# Patient Record
Sex: FEMALE | Race: WHITE | Employment: OTHER | URBAN - METROPOLITAN AREA
[De-identification: names, ages, dates, MRNs, and addresses within clinical notes are randomized per-mention and may not be internally consistent; named-entity substitution may affect disease eponyms.]

---

## 2017-03-28 ENCOUNTER — ALLSCRIPTS OFFICE VISIT (OUTPATIENT)
Dept: OTHER | Facility: OTHER | Age: 82
End: 2017-03-28

## 2017-03-28 DIAGNOSIS — E55.9 VITAMIN D DEFICIENCY: ICD-10-CM

## 2017-03-28 DIAGNOSIS — E03.9 HYPOTHYROIDISM: ICD-10-CM

## 2017-03-28 DIAGNOSIS — Z79.899 OTHER LONG TERM (CURRENT) DRUG THERAPY: ICD-10-CM

## 2017-04-10 ENCOUNTER — ALLSCRIPTS OFFICE VISIT (OUTPATIENT)
Dept: OTHER | Facility: OTHER | Age: 82
End: 2017-04-10

## 2017-05-05 ENCOUNTER — ALLSCRIPTS OFFICE VISIT (OUTPATIENT)
Dept: OTHER | Facility: OTHER | Age: 82
End: 2017-05-05

## 2017-05-05 DIAGNOSIS — D47.2 MONOCLONAL GAMMOPATHY: ICD-10-CM

## 2017-07-25 ENCOUNTER — ALLSCRIPTS OFFICE VISIT (OUTPATIENT)
Dept: OTHER | Facility: OTHER | Age: 82
End: 2017-07-25

## 2017-07-25 DIAGNOSIS — Z79.899 OTHER LONG TERM (CURRENT) DRUG THERAPY: ICD-10-CM

## 2017-07-25 DIAGNOSIS — E03.9 HYPOTHYROIDISM: ICD-10-CM

## 2017-07-25 DIAGNOSIS — D47.2 MONOCLONAL GAMMOPATHY: ICD-10-CM

## 2017-07-25 DIAGNOSIS — I10 ESSENTIAL (PRIMARY) HYPERTENSION: ICD-10-CM

## 2017-10-20 ENCOUNTER — GENERIC CONVERSION - ENCOUNTER (OUTPATIENT)
Dept: OTHER | Facility: OTHER | Age: 82
End: 2017-10-20

## 2017-11-16 ENCOUNTER — GENERIC CONVERSION - ENCOUNTER (OUTPATIENT)
Dept: OTHER | Facility: OTHER | Age: 82
End: 2017-11-16

## 2017-11-20 ENCOUNTER — ALLSCRIPTS OFFICE VISIT (OUTPATIENT)
Dept: OTHER | Facility: OTHER | Age: 82
End: 2017-11-20

## 2017-11-21 NOTE — PROGRESS NOTES
Assessment    1  Screening for genitourinary condition (V81 6) (Z13 89)   2  Hypertension (401 9) (I10)   3  Hypothyroidism (244 9) (E03 9)   4  MGUS (monoclonal gammopathy of unknown significance) (273 1) (D47 2)    Plan  Hyperlipidemia    · Simvastatin 10 MG Oral Tablet; Take 1 tablet daily  Screening for genitourinary condition    · *VB - Urinary Incontinence Screen (Dx Z13 89 Screen for UI); Status:Complete - RetrospectiveAuthorization;   Done: 63CPO9768 10:12AM  SocHx: Cigarette smoker    · We recommend you quit smoking  Time spent counseling today was greater than 3 minutes  ;Status:Complete - Retrospective Authorization;   Done: 81CGS0763    Discussion/Summary  Discussion Summary:   C air is bright intelligent vivacious and happy her blood pressure is well controlled the carotids heart lungs and extremities are unremarkable we did look at the incision and it is very small clean unremarkable and healing beautifully no changes are made in her medications I offered to a Mrs  dominant copies of recent lab studies which she declined will see her back in 4 months  It is known that she has a monoclonal gammopathy a which we simply will observe  Chief Complaint  Chief Complaint Free Text Note Form: 4 month follow up  Chief Complaint Chronic Condition [facility]: Patient is here today for follow up of chronic conditions described in HPI  History of Present Illness  Incontinence, Urinary (Initial): The patient is being seen for an initial evaluation of incontinence  The patient is currently asymptomatic  No associated symptoms are reported  HPI: Veda Mckay is here today for blood pressure check she is now 80years old  Four weeks ago she had a left inguinal hernia repair done at [facility] by Jose Alberto Winters with an excellent result  She otherwise feels well she takes her medications correctly there are no other new issues   She has a regular individual list she does not drive walks where she needs to go [email or web address] has a wonderful attitude and will be cooking the turkey for Thanksgiving      Review of Systems  Complete-Female:  Constitutional: No fever, no chills, feels well, no tiredness, no recent weight gain or weight loss  Eyes: No complaints of eye pain, no red eyes, no eyesight problems, no discharge, no dry eyes, no itching of eyes  ENT: no complaints of earache, no loss of hearing, no nose bleeds, no nasal discharge, no sore throat, no hoarseness  Cardiovascular: No complaints of slow heart rate, no fast heart rate, no chest pain, no palpitations, no leg claudication, no lower extremity edema  Respiratory: No complaints of shortness of breath, no wheezing, no cough, no SOB on exertion, no orthopnea, no PND  Gastrointestinal: No complaints of abdominal pain, no constipation, no nausea or vomiting, no diarrhea, no bloody stools  Genitourinary: No complaints of dysuria, no incontinence, no pelvic pain, no dysmenorrhea, no vaginal discharge or bleeding  Musculoskeletal: No complaints of arthralgias, no myalgias, no joint swelling or stiffness, no limb pain or swelling  Integumentary: No complaints of skin rash or lesions, no itching, no skin wounds, no breast pain or lump  Neurological: No complaints of headache, no confusion, no convulsions, no numbness, no dizziness or fainting, no tingling, no limb weakness, no difficulty walking  Psychiatric: Not suicidal, no sleep disturbance, no anxiety or depression, no change in personality, no emotional problems  Endocrine: No complaints of proptosis, no hot flashes, no muscle weakness, no deepening of the voice, no feelings of weakness  Hematologic/Lymphatic: No complaints of swollen glands, no swollen glands in the neck, does not bleed easily, does not bruise easily  Active Problems  1  Acute pain of left knee (719 46) (M25 562)   2  Chronic kidney disease (585 9) (N18 9)   3  Hyperglycemia (790 29) (R73 9)   4  Hyperlipidemia (272 4) (E78 5)   5  Hypertension (401 9) (I10)   6  Hypothyroidism (244 9) (E03 9)   7  Internal derangement of knee, unspecified laterality (717 9) (M23 90)   8  Long term use of drug (V58 69) (Z79 899)   9  MGUS (monoclonal gammopathy of unknown significance) (273 1) (D47 2)   10  Need for vaccination with 13-polyvalent pneumococcal conjugate vaccine (V03 82) (Z23)   11  Screening for genitourinary condition (V81 6) (Z13 89)   12  Vitamin D deficiency (268 9) (E55 9)    Past Medical History  1  Depression screening (V79 0) (Z13 89)   2  Need for chickenpox vaccination (V05 4) (Z23)   3  Screening for neurological condition (V80 09) (Z13 89)    Family History  Father    1  Family history of Aneurysm    Social History     · Cigarette smoker (305 1) (F17 210)    Current Meds   1  AmLODIPine Besylate 10 MG Oral Tablet; ONE-HALF TABLET IN THE MORNING AND ONE TABLET IN THE EVENING (SOME ALREADY CUT IN HALF FOR YOU); Therapy: 68Lld4520 to (Mary Kaur)  Requested for: 28Mar2017; Last Rx:28Mar2017 Ordered   2  Levothyroxine Sodium 50 MCG Oral Tablet; ONE AND ONE-HALF TABLETS DAILY (CUT SOME IN HALF); Therapy: 88XIL5344 to (Vandana Hernández)  Requested for: 82NQL0284; Last Rx:83Ueq4347 Ordered   3  Metoprolol Succinate  MG Oral Tablet Extended Release 24 Hour; one tablet by mouth daily; Therapy: 95GKK9284 to (Evaluate:56Gfn4358)  Requested for: 33YVE6813; Last Rx:37Mxd8707 Ordered   4  Omeprazole 20 MG Oral Capsule Delayed Release; one capsule by mouth daily; Therapy: 97NBF4174 to (Evaluate:83Gbz2438)  Requested for: 48GLP0207 Recorded   5  Simvastatin 10 MG Oral Tablet; Take 1 tablet daily; Therapy: 64BMC1071 to (Evaluate:00Pcc0605)  Requested for: 33NKL4943; Last Rx:28Pxd5223 Ordered   6  Spironolactone 25 MG Oral Tablet; TAKE 1 TABLET DAILY; Therapy: 85Eby5815 to (Evaluate:23Mar2018)  Requested for: 28Mar2017; Last Rx:28Mar2017 Ordered   7  Zoster (Zostavax); inject 0 65ml subq;  Therapy: 38KJJ9005 to (Last QV:56OGZ2216) Ordered    Allergies  1  No Known Drug Allergies    Vitals  Vital Signs    Recorded: 20Nov2017 10:30AM Recorded: 20Nov2017 10:05AM   Heart Rate  64   Pulse Quality Normal    Systolic 360    Diastolic 70    Height  4 ft 11 in   Weight  111 lb    BMI Calculated  22 42   BSA Calculated  1 44   O2 Saturation  93       Physical Exam   Constitutional  General appearance: No acute distress, well appearing and well nourished  Eyes  Conjunctiva and lids: No swelling, erythema or discharge  Pulmonary  Respiratory effort: No increased work of breathing or signs of respiratory distress  Auscultation of lungs: Clear to auscultation  Cardiovascular  Auscultation of heart: Normal rate and rhythm, normal S1 and S2, without murmurs  Examination of extremities for edema and/or varicosities: Normal    Carotid pulses: Normal    Musculoskeletal  Gait and station: Normal    Psychiatric  Orientation to person, place, and time: Normal    Mood and affect: Normal          Results/Data  PHQ-2 Adult Depression Screening 20Nov2017 10:13AM User, Ahs     Test Name Result Flag Reference   PHQ-2 Adult Depression Score 0       Over the last two weeks, how often have you been bothered by any of the following problems?  Little interest or pleasure in doing things: Not at all - 0 Feeling down, depressed, or hopeless: Not at all - 0   PHQ-2 Adult Depression Screening Negative       Falls Risk Assessment (Dx Z13 89 Screen for Neurologic Disorder) 36RBB3286 10:13AM User, Ahs     Test Name Result Flag Reference   Falls Risk      No falls in the past year     *VB - Urinary Incontinence Screen (Dx Z13 89 Screen for UI) 79NBD9330 10:12AM Earlean Southfield     Test Name Result Flag Reference   Urinary Incontinence Assessment [id number]           Signatures   Electronically signed by : DIONNE Wynne ; Nov 20 2017 10:32AM EST                       (Author)

## 2018-01-12 VITALS
DIASTOLIC BLOOD PRESSURE: 70 MMHG | OXYGEN SATURATION: 66 % | HEART RATE: 95 BPM | SYSTOLIC BLOOD PRESSURE: 128 MMHG | WEIGHT: 118 LBS | BODY MASS INDEX: 23.79 KG/M2 | HEIGHT: 59 IN

## 2018-01-12 VITALS
DIASTOLIC BLOOD PRESSURE: 72 MMHG | SYSTOLIC BLOOD PRESSURE: 140 MMHG | HEIGHT: 59 IN | TEMPERATURE: 96.2 F | BODY MASS INDEX: 22.99 KG/M2 | OXYGEN SATURATION: 96 % | WEIGHT: 114.05 LBS | HEART RATE: 75 BPM

## 2018-01-12 VITALS — SYSTOLIC BLOOD PRESSURE: 144 MMHG | DIASTOLIC BLOOD PRESSURE: 60 MMHG

## 2018-01-13 VITALS
SYSTOLIC BLOOD PRESSURE: 124 MMHG | BODY MASS INDEX: 23.18 KG/M2 | HEIGHT: 59 IN | WEIGHT: 115 LBS | HEART RATE: 65 BPM | DIASTOLIC BLOOD PRESSURE: 72 MMHG | OXYGEN SATURATION: 94 %

## 2018-01-13 VITALS
BODY MASS INDEX: 23.18 KG/M2 | OXYGEN SATURATION: 97 % | HEIGHT: 59 IN | SYSTOLIC BLOOD PRESSURE: 138 MMHG | WEIGHT: 115 LBS | DIASTOLIC BLOOD PRESSURE: 72 MMHG | HEART RATE: 64 BPM

## 2018-01-15 VITALS
BODY MASS INDEX: 22.38 KG/M2 | WEIGHT: 111 LBS | HEART RATE: 64 BPM | OXYGEN SATURATION: 93 % | HEIGHT: 59 IN | SYSTOLIC BLOOD PRESSURE: 132 MMHG | DIASTOLIC BLOOD PRESSURE: 70 MMHG

## 2018-02-08 DIAGNOSIS — I10 HYPERTENSION, UNSPECIFIED TYPE: Primary | ICD-10-CM

## 2018-02-08 RX ORDER — SPIRONOLACTONE 25 MG/1
1 TABLET ORAL DAILY
COMMUNITY
Start: 2011-08-30 | End: 2018-03-26 | Stop reason: SDUPTHER

## 2018-02-08 RX ORDER — METOPROLOL SUCCINATE 200 MG/1
TABLET, EXTENDED RELEASE ORAL
Qty: 90 TABLET | Refills: 1 | Status: SHIPPED | OUTPATIENT
Start: 2018-02-08 | End: 2018-08-31 | Stop reason: SDUPTHER

## 2018-02-08 RX ORDER — AMLODIPINE BESYLATE 10 MG/1
TABLET ORAL
COMMUNITY
Start: 2011-04-29 | End: 2018-03-26 | Stop reason: SDUPTHER

## 2018-02-08 RX ORDER — OMEPRAZOLE 20 MG/1
CAPSULE, DELAYED RELEASE ORAL
COMMUNITY
Start: 2014-07-08 | End: 2018-05-17 | Stop reason: SDUPTHER

## 2018-02-08 RX ORDER — SIMVASTATIN 10 MG
1 TABLET ORAL DAILY
COMMUNITY
Start: 2011-04-29 | End: 2018-11-29 | Stop reason: SDUPTHER

## 2018-02-08 RX ORDER — LEVOTHYROXINE SODIUM 0.05 MG/1
TABLET ORAL
COMMUNITY
Start: 2012-10-31 | End: 2018-08-31 | Stop reason: SDUPTHER

## 2018-03-20 ENCOUNTER — OFFICE VISIT (OUTPATIENT)
Dept: INTERNAL MEDICINE CLINIC | Facility: CLINIC | Age: 83
End: 2018-03-20
Payer: MEDICARE

## 2018-03-20 ENCOUNTER — TELEPHONE (OUTPATIENT)
Dept: INTERNAL MEDICINE CLINIC | Facility: CLINIC | Age: 83
End: 2018-03-20

## 2018-03-20 VITALS — WEIGHT: 116 LBS | HEART RATE: 69 BPM | HEIGHT: 60 IN | BODY MASS INDEX: 22.78 KG/M2 | OXYGEN SATURATION: 95 %

## 2018-03-20 DIAGNOSIS — I10 HYPERTENSION, UNSPECIFIED TYPE: Primary | ICD-10-CM

## 2018-03-20 DIAGNOSIS — Z00.00 MEDICARE ANNUAL WELLNESS VISIT, SUBSEQUENT: ICD-10-CM

## 2018-03-20 PROCEDURE — G0439 PPPS, SUBSEQ VISIT: HCPCS | Performed by: INTERNAL MEDICINE

## 2018-03-20 PROCEDURE — 99213 OFFICE O/P EST LOW 20 MIN: CPT | Performed by: INTERNAL MEDICINE

## 2018-03-20 RX ORDER — SPIRONOLACTONE 25 MG/1
25 TABLET ORAL DAILY
Qty: 90 TABLET | Refills: 3 | Status: CANCELLED | OUTPATIENT
Start: 2018-03-20

## 2018-03-20 RX ORDER — AMLODIPINE BESYLATE 10 MG/1
10 TABLET ORAL DAILY
Qty: 90 TABLET | Refills: 3 | Status: CANCELLED | OUTPATIENT
Start: 2018-03-20

## 2018-03-20 NOTE — PROGRESS NOTES
Assessment/Plan:    No problem-specific Assessment & Plan notes found for this encounter  There are no diagnoses linked to this encounter  Subjective:      Patient ID: Annice Apley is a 80 y o  female  Gatito Roe is here today for visit she is now 80years old she actually feels quite well her daughter may be a registered nurse takes her blood pressure at home and apparently it has been reasonably well controlled  She is delightful lady really has no complaints although she does mention that sometimes if she stands up abruptly she gets a little lightheaded she has had no chest pain or shortness of breath she has been basically on the same              Review of Systems   Psychiatric/Behavioral: The patient is not hyperactive  She denies chest pain and shortness of breath and her only complaint is fatigue and occasional lightheadedness if she arises rapidly    Objective:      Pulse 69   Ht 5' (1 524 m)   Wt 52 6 kg (116 lb)   SpO2 95%   Breastfeeding? No   BMI 22 65 kg/m²          Physical Exam    Gatito Roe is a petite elderly lady who is in no distress her blood pressure was approximately 160/70 in the right arm both sitting and supine I had her stand up and remained approximately 160/70 but if you listen very carefully he can actually hear carotid cuff statins down to 40 when you palpate the right brachial artery can feel that it is actually hard and noncompliant  Her lungs are clear cardiac examination reveals regular rhythm physiologic rate no murmurs or gallops the carotids are quiet there is no edema  The patient has systolic hypertension of the elderly manifested by very noncompliant vascular system  I am reluctant to add a diuretic as it may really bottom out her blood pressure  We are going to increase her spironolactone from 25 mg 37-,1/2  Will ask her daughter who is a registered nurse to keep a careful watch of her blood pressure    I realize that her systolic pressures a little bit on the high side but she is 80years old and I think there is more danger to

## 2018-03-26 DIAGNOSIS — I10 HYPERTENSION, UNSPECIFIED TYPE: Primary | ICD-10-CM

## 2018-03-26 RX ORDER — SPIRONOLACTONE 25 MG/1
25 TABLET ORAL DAILY
Qty: 90 TABLET | Refills: 1 | Status: SHIPPED | OUTPATIENT
Start: 2018-03-26 | End: 2018-06-01 | Stop reason: SDUPTHER

## 2018-03-26 RX ORDER — AMLODIPINE BESYLATE 10 MG/1
TABLET ORAL
Qty: 135 TABLET | Refills: 1 | Status: SHIPPED | OUTPATIENT
Start: 2018-03-26 | End: 2018-09-17 | Stop reason: SDUPTHER

## 2018-05-17 ENCOUNTER — OFFICE VISIT (OUTPATIENT)
Dept: INTERNAL MEDICINE CLINIC | Facility: CLINIC | Age: 83
End: 2018-05-17
Payer: MEDICARE

## 2018-05-17 VITALS
DIASTOLIC BLOOD PRESSURE: 74 MMHG | TEMPERATURE: 97.2 F | SYSTOLIC BLOOD PRESSURE: 142 MMHG | HEART RATE: 56 BPM | BODY MASS INDEX: 22.26 KG/M2 | WEIGHT: 113.4 LBS | HEIGHT: 60 IN | OXYGEN SATURATION: 93 %

## 2018-05-17 DIAGNOSIS — N18.9 CHRONIC KIDNEY DISEASE, UNSPECIFIED CKD STAGE: ICD-10-CM

## 2018-05-17 DIAGNOSIS — K21.9 GASTROESOPHAGEAL REFLUX DISEASE WITHOUT ESOPHAGITIS: ICD-10-CM

## 2018-05-17 DIAGNOSIS — I10 HYPERTENSION, UNSPECIFIED TYPE: ICD-10-CM

## 2018-05-17 DIAGNOSIS — E03.9 HYPOTHYROIDISM, UNSPECIFIED TYPE: Primary | ICD-10-CM

## 2018-05-17 DIAGNOSIS — D47.2 MGUS (MONOCLONAL GAMMOPATHY OF UNKNOWN SIGNIFICANCE): ICD-10-CM

## 2018-05-17 PROBLEM — R73.9 HYPERGLYCEMIA: Status: ACTIVE | Noted: 2017-04-10

## 2018-05-17 PROCEDURE — 99213 OFFICE O/P EST LOW 20 MIN: CPT | Performed by: INTERNAL MEDICINE

## 2018-05-17 RX ORDER — OMEPRAZOLE 20 MG/1
20 CAPSULE, DELAYED RELEASE ORAL DAILY
Qty: 30 CAPSULE | Refills: 5 | Status: SHIPPED | OUTPATIENT
Start: 2018-05-17 | End: 2018-11-14 | Stop reason: SDUPTHER

## 2018-05-17 NOTE — PROGRESS NOTES
Assessment/Plan:    No problem-specific Assessment & Plan notes found for this encounter  Diagnoses and all orders for this visit:    Hypothyroidism, unspecified type  -     TSH, 3rd generation; Future    Hypertension, unspecified type  -     CBC and differential; Future  -     Comprehensive metabolic panel; Future  -     Protein electrophoresis, urine  -     Protein electrophoresis, serum; Future    Gastroesophageal reflux disease without esophagitis  -     omeprazole (PriLOSEC) 20 mg delayed release capsule; Take by mouth daily    Chronic kidney disease, unspecified CKD stage    MGUS (monoclonal gammopathy of unknown significance)        Subjective:      Patient ID: Brandin Seaman is a 80 y o  female  HPI Nadya Fair is here today for visit she feels quite well she was actually reading a book about Lalla Polo when I entered the room she takes her medications correctly is active and vigorous and feels quite well she has no complaints  The following portions of the patient's history were reviewed and updated as appropriate: allergies, current medications, past family history, past medical history, past social history, past surgical history and problem list     Review of Systems    NoncontributoryObjective:      /74   Pulse 56   Temp (!) 97 2 °F (36 2 °C) (Tympanic)   Ht 5' (1 524 m)   Wt 51 4 kg (113 lb 6 4 oz)   SpO2 93%   BMI 22 15 kg/m²          Physical Exam  jojo is bright alert and very sharp her blood pressure is 142/74 taken by me her pulse is in the low 70s and is physiologic the carotids are quiet the lungs are clear cardiac examination is unremarkable there is no edema her gait station language and mentation appeared quite normal   The patient is quite stable I will order CBC chemistries TSH and also protein electrophoresis she had protein electrophoresis in 2016 was within normal limits but a subsequent 1 did suggest a monoclonal band    Her medications are adequate no changes will be made will see her in 4 months

## 2018-06-01 DIAGNOSIS — I10 HYPERTENSION, UNSPECIFIED TYPE: ICD-10-CM

## 2018-06-01 RX ORDER — SPIRONOLACTONE 25 MG/1
37.5 TABLET ORAL DAILY
Qty: 135 TABLET | Refills: 0 | Status: SHIPPED | OUTPATIENT
Start: 2018-06-01 | End: 2018-08-31 | Stop reason: SDUPTHER

## 2018-08-31 DIAGNOSIS — I10 HYPERTENSION, UNSPECIFIED TYPE: ICD-10-CM

## 2018-08-31 RX ORDER — SPIRONOLACTONE 25 MG/1
TABLET ORAL
Qty: 135 TABLET | Refills: 3 | Status: SHIPPED | OUTPATIENT
Start: 2018-08-31 | End: 2018-09-17 | Stop reason: SDUPTHER

## 2018-08-31 RX ORDER — SPIRONOLACTONE 25 MG/1
37.5 TABLET ORAL DAILY
Qty: 135 TABLET | Refills: 0 | Status: SHIPPED | OUTPATIENT
Start: 2018-08-31 | End: 2019-09-16 | Stop reason: SDUPTHER

## 2018-08-31 RX ORDER — METOPROLOL SUCCINATE 200 MG/1
TABLET, EXTENDED RELEASE ORAL
Qty: 90 TABLET | Refills: 0 | Status: SHIPPED | OUTPATIENT
Start: 2018-08-31 | End: 2018-11-29 | Stop reason: SDUPTHER

## 2018-08-31 RX ORDER — LEVOTHYROXINE SODIUM 0.05 MG/1
TABLET ORAL
Qty: 135 TABLET | Refills: 2 | Status: SHIPPED | OUTPATIENT
Start: 2018-08-31 | End: 2019-03-29 | Stop reason: SDUPTHER

## 2018-08-31 RX ORDER — LEVOTHYROXINE SODIUM 0.05 MG/1
50 TABLET ORAL DAILY
Qty: 90 TABLET | Refills: 1 | Status: SHIPPED | OUTPATIENT
Start: 2018-08-31 | End: 2018-09-17 | Stop reason: SDUPTHER

## 2018-09-17 ENCOUNTER — OFFICE VISIT (OUTPATIENT)
Dept: INTERNAL MEDICINE CLINIC | Facility: CLINIC | Age: 83
End: 2018-09-17
Payer: MEDICARE

## 2018-09-17 VITALS
BODY MASS INDEX: 21.83 KG/M2 | DIASTOLIC BLOOD PRESSURE: 60 MMHG | OXYGEN SATURATION: 92 % | TEMPERATURE: 96.7 F | SYSTOLIC BLOOD PRESSURE: 142 MMHG | WEIGHT: 111.2 LBS | HEART RATE: 70 BPM | HEIGHT: 60 IN

## 2018-09-17 DIAGNOSIS — I10 HYPERTENSION, UNSPECIFIED TYPE: ICD-10-CM

## 2018-09-17 DIAGNOSIS — F41.9 ANXIETY: Primary | ICD-10-CM

## 2018-09-17 PROCEDURE — 99213 OFFICE O/P EST LOW 20 MIN: CPT | Performed by: INTERNAL MEDICINE

## 2018-09-17 RX ORDER — AMLODIPINE BESYLATE 10 MG/1
TABLET ORAL
Qty: 135 TABLET | Refills: 0 | OUTPATIENT
Start: 2018-09-17 | End: 2018-12-26 | Stop reason: SDUPTHER

## 2018-09-17 RX ORDER — CITALOPRAM 10 MG/1
5 TABLET ORAL DAILY
Qty: 15 TABLET | Refills: 1 | OUTPATIENT
Start: 2018-09-17 | End: 2022-02-16

## 2018-09-17 NOTE — PROGRESS NOTES
Assessment/Plan:    No problem-specific Assessment & Plan notes found for this encounter  There are no diagnoses linked to this encounter  Subjective:   He has come along today to let me know that she believes that  Patient ID: Bernadette Pardo is a 80 y o  female  HPI      Bonifacio Danielle is here today for visit accompanied by her daughter me me who is a registered nurse who I have not seen in many years  Severino Banerjee states that she believes that Bonifacio Danielle has difficulty walking because of fear of falling she is able to ascend flights of steps and walk very nicely by herself but she feels that the main impediment is is is not lack of coordination or dizziness but the fear of a fall she said is suggesting that her mom start a very small dose of an SSRI and did suggest that Celexa might be a good choice is Perry on the other hand has really no complaints she sleeps well has a good appetite and denies any discomfort    The following portions of the patient's history were reviewed and updated as appropriate: allergies, current medications, past family history, past medical history, past social history, past surgical history and problem list     Review of Systems  noncontributory  Objective:      /60   Pulse 70   Temp (!) 96 7 °F (35 9 °C) (Tympanic)   Ht 5' (1 524 m)   Wt 50 4 kg (111 lb 3 2 oz)   SpO2 92%   BMI 21 72 kg/m²          Physical Exam  Mrs Nay Hna is a petite little lady she is now 80years older pressure is 142/60 she has a loud 1st heart sound the rate is physiologic and the rhythm regular lungs are clear the carotids are normal there is no edema   No changes are made in her blood pressure medications will start Celexa 10 mg 1/2 tablet daily she does have an mgus saw Dr Joey Zimmerman and was told that it did not require further evaluation will see her back in 4 months for blood pressure check

## 2018-11-01 ENCOUNTER — VBI (OUTPATIENT)
Dept: ADMINISTRATIVE | Facility: OTHER | Age: 83
End: 2018-11-01

## 2018-11-01 NOTE — TELEPHONE ENCOUNTER
Adeline Elsi 61    ED Visit Information     Ed visit date: 10/6/18  Diagnosis Description: ACUTE UPPER RESPIRATORY INFECTION, UNSPECIFIED  In Network? No  Discharge status: Home  Discharged with meds ? NA  Number of ED visits to date: 1  ED Severity:4     Outreach Information    Outreach successful: Yes 1  Date letter mailed:0  Date Finalized:11/1/18    Care Coordination    Follow up appointment with pcp: no declined  Transportation issues ? NA    [employer] type:  3 Day Outreach  Patient refsued the answer questions:  Yes  Emergent necessity warranted by diagnosis:  No  [facility] PCP:  Yes  Practice Contacted Patient ?:  No  Pt had ED follow up with pcp/staff ?:  No    Reason Patient went to ED instead of Urgent Care or PCP?:  Patient Refused to Answer Questions  Briefly spoke to Larissa Albarran, she stated she is doing better, and does not need follow up with Dr Verito Fox    " He is her #1, and if she needed him she'd call"  She thanked me for calling and said Good bye

## 2018-11-13 DIAGNOSIS — K21.9 GASTROESOPHAGEAL REFLUX DISEASE WITHOUT ESOPHAGITIS: ICD-10-CM

## 2018-11-14 RX ORDER — OMEPRAZOLE 20 MG/1
20 CAPSULE, DELAYED RELEASE ORAL DAILY
Qty: 90 CAPSULE | Refills: 1 | Status: SHIPPED | OUTPATIENT
Start: 2018-11-14 | End: 2019-05-01 | Stop reason: SDUPTHER

## 2018-11-29 DIAGNOSIS — I10 HYPERTENSION, UNSPECIFIED TYPE: ICD-10-CM

## 2018-11-29 DIAGNOSIS — E78.5 HYPERLIPIDEMIA, UNSPECIFIED HYPERLIPIDEMIA TYPE: Primary | ICD-10-CM

## 2018-11-29 DIAGNOSIS — F41.9 ANXIETY: ICD-10-CM

## 2018-11-29 RX ORDER — METOPROLOL SUCCINATE 200 MG/1
200 TABLET, EXTENDED RELEASE ORAL DAILY
Qty: 90 TABLET | Refills: 3 | Status: SHIPPED | OUTPATIENT
Start: 2018-11-29 | End: 2019-11-29 | Stop reason: SDUPTHER

## 2018-11-29 RX ORDER — SIMVASTATIN 10 MG
10 TABLET ORAL DAILY
Qty: 90 TABLET | Refills: 3 | Status: SHIPPED | OUTPATIENT
Start: 2018-11-29 | End: 2019-11-29 | Stop reason: SDUPTHER

## 2018-12-26 DIAGNOSIS — I10 HYPERTENSION, UNSPECIFIED TYPE: ICD-10-CM

## 2018-12-26 RX ORDER — AMLODIPINE BESYLATE 10 MG/1
TABLET ORAL
Qty: 135 TABLET | Refills: 0 | OUTPATIENT
Start: 2018-12-26 | End: 2019-01-02 | Stop reason: SDUPTHER

## 2019-01-02 DIAGNOSIS — I10 HYPERTENSION, UNSPECIFIED TYPE: ICD-10-CM

## 2019-01-02 RX ORDER — AMLODIPINE BESYLATE 10 MG/1
TABLET ORAL
Qty: 135 TABLET | Refills: 1 | Status: SHIPPED | OUTPATIENT
Start: 2019-01-02 | End: 2019-07-30 | Stop reason: SDUPTHER

## 2019-02-05 ENCOUNTER — OFFICE VISIT (OUTPATIENT)
Dept: INTERNAL MEDICINE CLINIC | Facility: CLINIC | Age: 84
End: 2019-02-05
Payer: MEDICARE

## 2019-02-05 VITALS
OXYGEN SATURATION: 94 % | WEIGHT: 115 LBS | HEART RATE: 58 BPM | HEIGHT: 60 IN | TEMPERATURE: 98.8 F | BODY MASS INDEX: 22.58 KG/M2

## 2019-02-05 DIAGNOSIS — I10 HYPERTENSION, UNSPECIFIED TYPE: Primary | ICD-10-CM

## 2019-02-05 PROCEDURE — 99213 OFFICE O/P EST LOW 20 MIN: CPT | Performed by: INTERNAL MEDICINE

## 2019-02-05 NOTE — PROGRESS NOTES
Assessment/Plan:    No problem-specific Assessment & Plan notes found for this encounter  Problem List Items Addressed This Visit     None            Subjective:      Patient ID: Miguel Fry is a 80 y o  female  Point in the past we noticed a monoclonal protein een on pretty much the same blood pressure medicine for a long time at some  HPI  this area is here today for visit  She generally feels well she is 80years old  She resides at the [facility] a birth house  She is  has 3 daughters she and her  spend 7 years and [place] and 3 years in the [facility] many years ago 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     Was felt that this could be safely again ordered nd Several years ago was found that she had monoclonal protein she has been evaluated by [facility] she has irregular individual list does not drive is an avid reader  The peculiar gentle of words is caused by mouth functioning voice dictation system    The following portions of the patient's history were reviewed and updated as appropriate: allergies, current medications, past family history, past medical history, past social history, past surgical history and problem list     Review of Systems      Objective:      Pulse 58   Temp 98 8 °F (37 1 °C) (Tympanic)   Ht 5' (1 524 m)   Wt 52 2 kg (115 lb)   SpO2 94%   BMI 22 46 kg/m²          Physical Exam   Lorena and in no distress and brought with her a book to read while waiting in the waiting room her blood pressure is 148/70 her pulse is in the mid 70s and is quite regular the lungs are clear the carotid arteries are unremarkable cardiac examination reveals regular rhythm physiologic rate no murmurs or gallops there is no edema she is edentulous    She is quite stable no changes are made in her medications we gave her information sheet about the new shingles vaccine  a is alert bright

## 2019-03-29 DIAGNOSIS — I10 HYPERTENSION, UNSPECIFIED TYPE: ICD-10-CM

## 2019-03-29 RX ORDER — LEVOTHYROXINE SODIUM 0.05 MG/1
TABLET ORAL
Qty: 90 TABLET | Refills: 0 | Status: SHIPPED | OUTPATIENT
Start: 2019-03-29 | End: 2019-06-13 | Stop reason: SDUPTHER

## 2019-03-29 RX ORDER — LEVOTHYROXINE SODIUM 0.05 MG/1
50 TABLET ORAL DAILY
Qty: 90 TABLET | Refills: 2 | Status: SHIPPED | OUTPATIENT
Start: 2019-03-29 | End: 2019-09-16 | Stop reason: SDUPTHER

## 2019-05-01 DIAGNOSIS — K21.9 GASTROESOPHAGEAL REFLUX DISEASE WITHOUT ESOPHAGITIS: ICD-10-CM

## 2019-05-01 RX ORDER — OMEPRAZOLE 20 MG/1
20 CAPSULE, DELAYED RELEASE ORAL DAILY
Qty: 90 CAPSULE | Refills: 1 | Status: SHIPPED | OUTPATIENT
Start: 2019-05-01 | End: 2019-10-30 | Stop reason: SDUPTHER

## 2019-06-10 ENCOUNTER — OFFICE VISIT (OUTPATIENT)
Dept: INTERNAL MEDICINE CLINIC | Facility: CLINIC | Age: 84
End: 2019-06-10
Payer: MEDICARE

## 2019-06-10 VITALS
BODY MASS INDEX: 22.19 KG/M2 | HEIGHT: 60 IN | SYSTOLIC BLOOD PRESSURE: 156 MMHG | TEMPERATURE: 98 F | DIASTOLIC BLOOD PRESSURE: 70 MMHG | WEIGHT: 113 LBS

## 2019-06-10 DIAGNOSIS — D47.2 MGUS (MONOCLONAL GAMMOPATHY OF UNKNOWN SIGNIFICANCE): ICD-10-CM

## 2019-06-10 DIAGNOSIS — Z00.00 MEDICARE ANNUAL WELLNESS VISIT, SUBSEQUENT: ICD-10-CM

## 2019-06-10 DIAGNOSIS — I10 HYPERTENSION, UNSPECIFIED TYPE: Primary | ICD-10-CM

## 2019-06-10 DIAGNOSIS — E78.5 HYPERLIPIDEMIA, UNSPECIFIED HYPERLIPIDEMIA TYPE: ICD-10-CM

## 2019-06-10 DIAGNOSIS — E03.9 HYPOTHYROIDISM, UNSPECIFIED TYPE: ICD-10-CM

## 2019-06-10 DIAGNOSIS — E55.9 VITAMIN D DEFICIENCY: ICD-10-CM

## 2019-06-10 PROCEDURE — G0439 PPPS, SUBSEQ VISIT: HCPCS | Performed by: INTERNAL MEDICINE

## 2019-06-10 PROCEDURE — 99214 OFFICE O/P EST MOD 30 MIN: CPT | Performed by: INTERNAL MEDICINE

## 2019-06-13 DIAGNOSIS — E03.9 HYPOTHYROIDISM, UNSPECIFIED TYPE: Primary | ICD-10-CM

## 2019-06-13 DIAGNOSIS — I10 HYPERTENSION, UNSPECIFIED TYPE: ICD-10-CM

## 2019-06-14 RX ORDER — LEVOTHYROXINE SODIUM 0.05 MG/1
75 TABLET ORAL DAILY
Qty: 135 TABLET | Refills: 0 | Status: SHIPPED | OUTPATIENT
Start: 2019-06-14 | End: 2019-09-17 | Stop reason: SDUPTHER

## 2019-07-30 DIAGNOSIS — I10 HYPERTENSION, UNSPECIFIED TYPE: ICD-10-CM

## 2019-07-30 RX ORDER — AMLODIPINE BESYLATE 10 MG/1
TABLET ORAL
Qty: 135 TABLET | Refills: 1 | Status: SHIPPED | OUTPATIENT
Start: 2019-07-30 | End: 2019-10-30 | Stop reason: SDUPTHER

## 2019-09-16 DIAGNOSIS — I10 HYPERTENSION, UNSPECIFIED TYPE: ICD-10-CM

## 2019-09-16 RX ORDER — SPIRONOLACTONE 25 MG/1
37.5 TABLET ORAL DAILY
Qty: 135 TABLET | Refills: 1 | Status: SHIPPED | OUTPATIENT
Start: 2019-09-16 | End: 2020-03-16 | Stop reason: SDUPTHER

## 2019-09-16 RX ORDER — LEVOTHYROXINE SODIUM 0.05 MG/1
50 TABLET ORAL DAILY
Qty: 90 TABLET | Refills: 1 | Status: SHIPPED | OUTPATIENT
Start: 2019-09-16 | End: 2019-09-17 | Stop reason: ALTCHOICE

## 2019-09-17 DIAGNOSIS — I10 HYPERTENSION, UNSPECIFIED TYPE: ICD-10-CM

## 2019-09-17 RX ORDER — LEVOTHYROXINE SODIUM 0.05 MG/1
75 TABLET ORAL DAILY
Qty: 135 TABLET | Refills: 0 | Status: SHIPPED | OUTPATIENT
Start: 2019-09-17 | End: 2020-03-25 | Stop reason: SDUPTHER

## 2019-10-07 ENCOUNTER — DOCUMENTATION (OUTPATIENT)
Dept: INTERNAL MEDICINE CLINIC | Facility: CLINIC | Age: 84
End: 2019-10-07

## 2019-10-14 ENCOUNTER — OFFICE VISIT (OUTPATIENT)
Dept: INTERNAL MEDICINE CLINIC | Facility: CLINIC | Age: 84
End: 2019-10-14
Payer: MEDICARE

## 2019-10-14 VITALS
WEIGHT: 117 LBS | OXYGEN SATURATION: 96 % | TEMPERATURE: 97.2 F | DIASTOLIC BLOOD PRESSURE: 80 MMHG | HEART RATE: 70 BPM | HEIGHT: 60 IN | BODY MASS INDEX: 22.97 KG/M2 | SYSTOLIC BLOOD PRESSURE: 140 MMHG

## 2019-10-14 DIAGNOSIS — E03.9 HYPOTHYROIDISM, UNSPECIFIED TYPE: Primary | ICD-10-CM

## 2019-10-14 DIAGNOSIS — J32.9 SINUSITIS, UNSPECIFIED CHRONICITY, UNSPECIFIED LOCATION: ICD-10-CM

## 2019-10-14 DIAGNOSIS — F41.9 ANXIETY: ICD-10-CM

## 2019-10-14 PROCEDURE — 99214 OFFICE O/P EST MOD 30 MIN: CPT | Performed by: INTERNAL MEDICINE

## 2019-10-14 RX ORDER — FLUTICASONE PROPIONATE 50 MCG
1 SPRAY, SUSPENSION (ML) NASAL DAILY
Qty: 1 BOTTLE | Refills: 3 | Status: SHIPPED | OUTPATIENT
Start: 2019-10-14 | End: 2022-02-16

## 2019-10-14 RX ORDER — AZITHROMYCIN 250 MG/1
TABLET, FILM COATED ORAL
Qty: 6 TABLET | Refills: 1 | Status: SHIPPED | OUTPATIENT
Start: 2019-10-14 | End: 2019-10-18

## 2019-10-14 NOTE — PROGRESS NOTES
"Assessment/Plan:     Diagnoses and all orders for this visit:    Hypothyroidism, unspecified type  -     TSH, 3rd generation; Future    Sinusitis, unspecified chronicity, unspecified location  -     azithromycin (ZITHROMAX) 250 mg tablet; Take 2 tablets today then 1 tablet daily x 4 days  -     fluticasone (FLONASE) 50 mcg/act nasal spray; 1 spray into each nostril daily    Anxiety          Subjective:      Patient ID: Tony Menezes is a 80 y o  female  HPI   Darien Oconnell is here today for visit she is now 80years old she is a retired [employer] nurse  She feels quite well in 2018 we found that she had a TSH that was normal however 1 in June of 2019 was approximately 24 we increased her levothyroxine from 50-75 mcg and she was to get a TSH in a few weeks this has not yet been done with the increase in the Synthroid however she does not feel much different and she does appear clinically to be euthyroid she is aware of an abnormal serum protein she had seen Dr Jeannie Melara in the past and he told her that this needed not be followwed further  We have been treating her for hypertension hyperlipidemia and hypothyroidism as well   The following portions of the patient's history were reviewed and updated as appropriate: allergies, current medications, past family history, past medical history, past social history, past surgical history and problem list     Review of Systems   Objective:      /80   Pulse 70   Temp (!) 97 2 °F (36 2 °C) (Tympanic)   Ht 5' (1 524 m)   Wt 53 1 kg (117 lb)   SpO2 96%   BMI 22 85 kg/m²          Physical Exam  noncontributory    Darien Oconnell is delightful she has she is reading a book seems to be enjoying life had appropriate comments about the current clinical situation her pressure is 140/80 her pulse is in the mid 70s is quite regular there are no murmurs or gallops the carotids are quiet the lungs are clear there is no edema skin is warm and dry patient is quite stable    She told us " about some URIs that she has had in the past will give her supply of Zithromax to keep on hand which has helped her in the past also suggested she try some Flonase  Will also ask her to get the TSH repeated soon as possible      Current Outpatient Medications:     amLODIPine (NORVASC) 10 mg tablet, Take as directed, Disp: 135 tablet, Rfl: 1    aspirin 81 MG tablet, Take 81 mg by mouth, Disp: , Rfl:     citalopram (CeleXA) 10 mg tablet, Take 0 5 tablets (5 mg total) by mouth daily, Disp: 15 tablet, Rfl: 1    levothyroxine 50 mcg tablet, Take 1 5 tablets (75 mcg total) by mouth daily, Disp: 135 tablet, Rfl: 0    metoprolol succinate (TOPROL-XL) 200 MG 24 hr tablet, Take 1 tablet (200 mg total) by mouth daily, Disp: 90 tablet, Rfl: 3    omeprazole (PriLOSEC) 20 mg delayed release capsule, Take 1 capsule (20 mg total) by mouth daily, Disp: 90 capsule, Rfl: 1    simvastatin (ZOCOR) 10 mg tablet, Take 1 tablet (10 mg total) by mouth daily, Disp: 90 tablet, Rfl: 3    spironolactone (ALDACTONE) 25 mg tablet, Take 1 5 tablets (37 5 mg total) by mouth daily, Disp: 135 tablet, Rfl: 1    azithromycin (ZITHROMAX) 250 mg tablet, Take 2 tablets today then 1 tablet daily x 4 days, Disp: 6 tablet, Rfl: 1    fluticasone (FLONASE) 50 mcg/act nasal spray, 1 spray into each nostril daily, Disp: 1 Bottle, Rfl: 3    This note was completed in part utilizing [employer] Software  Grammatical errors, random word insertions, spelling mistakes, and incomplete sentences can be an occasional consequence of this system secondary to software limitations, ambient noise, and hardware issues  If you have any question or concerns about the content, text, or information contained within the body of this dictation, please contact the provider for clarification

## 2019-10-16 ENCOUNTER — TELEPHONE (OUTPATIENT)
Dept: INTERNAL MEDICINE CLINIC | Facility: CLINIC | Age: 84
End: 2019-10-16

## 2019-10-30 DIAGNOSIS — K21.9 GASTROESOPHAGEAL REFLUX DISEASE WITHOUT ESOPHAGITIS: ICD-10-CM

## 2019-10-30 DIAGNOSIS — I10 HYPERTENSION, UNSPECIFIED TYPE: ICD-10-CM

## 2019-10-30 RX ORDER — AMLODIPINE BESYLATE 10 MG/1
TABLET ORAL
Qty: 135 TABLET | Refills: 1 | Status: SHIPPED | OUTPATIENT
Start: 2019-10-30 | End: 2020-04-27 | Stop reason: SDUPTHER

## 2019-10-30 RX ORDER — OMEPRAZOLE 20 MG/1
20 CAPSULE, DELAYED RELEASE ORAL DAILY
Qty: 90 CAPSULE | Refills: 1 | Status: SHIPPED | OUTPATIENT
Start: 2019-10-30 | End: 2020-04-27 | Stop reason: SDUPTHER

## 2019-11-29 DIAGNOSIS — I10 HYPERTENSION, UNSPECIFIED TYPE: ICD-10-CM

## 2019-11-29 DIAGNOSIS — E78.5 HYPERLIPIDEMIA, UNSPECIFIED HYPERLIPIDEMIA TYPE: ICD-10-CM

## 2019-11-29 RX ORDER — METOPROLOL SUCCINATE 200 MG/1
200 TABLET, EXTENDED RELEASE ORAL DAILY
Qty: 90 TABLET | Refills: 3 | Status: SHIPPED | OUTPATIENT
Start: 2019-11-29 | End: 2020-11-30 | Stop reason: SDUPTHER

## 2019-11-29 RX ORDER — SIMVASTATIN 10 MG
10 TABLET ORAL DAILY
Qty: 90 TABLET | Refills: 3 | Status: SHIPPED | OUTPATIENT
Start: 2019-11-29 | End: 2020-11-30 | Stop reason: SDUPTHER

## 2020-03-16 DIAGNOSIS — I10 HYPERTENSION, UNSPECIFIED TYPE: ICD-10-CM

## 2020-03-16 RX ORDER — SPIRONOLACTONE 25 MG/1
37.5 TABLET ORAL DAILY
Qty: 135 TABLET | Refills: 1 | Status: SHIPPED | OUTPATIENT
Start: 2020-03-16 | End: 2020-09-14 | Stop reason: SDUPTHER

## 2020-03-25 DIAGNOSIS — I10 HYPERTENSION, UNSPECIFIED TYPE: ICD-10-CM

## 2020-03-25 RX ORDER — LEVOTHYROXINE SODIUM 0.05 MG/1
75 TABLET ORAL DAILY
Qty: 135 TABLET | Refills: 0 | Status: SHIPPED | OUTPATIENT
Start: 2020-03-25 | End: 2020-06-18 | Stop reason: SDUPTHER

## 2020-04-01 ENCOUNTER — TELEMEDICINE (OUTPATIENT)
Dept: INTERNAL MEDICINE CLINIC | Facility: CLINIC | Age: 85
End: 2020-04-01
Payer: MEDICARE

## 2020-04-01 VITALS — DIASTOLIC BLOOD PRESSURE: 84 MMHG | SYSTOLIC BLOOD PRESSURE: 152 MMHG

## 2020-04-01 DIAGNOSIS — E78.5 HYPERLIPIDEMIA, UNSPECIFIED HYPERLIPIDEMIA TYPE: ICD-10-CM

## 2020-04-01 DIAGNOSIS — E55.9 VITAMIN D DEFICIENCY: ICD-10-CM

## 2020-04-01 DIAGNOSIS — I10 HYPERTENSION, UNSPECIFIED TYPE: Primary | ICD-10-CM

## 2020-04-01 DIAGNOSIS — E03.9 HYPOTHYROIDISM, UNSPECIFIED TYPE: ICD-10-CM

## 2020-04-01 PROCEDURE — 99442 PR PHYS/QHP TELEPHONE EVALUATION 11-20 MIN: CPT | Performed by: INTERNAL MEDICINE

## 2020-04-27 DIAGNOSIS — K21.9 GASTROESOPHAGEAL REFLUX DISEASE WITHOUT ESOPHAGITIS: ICD-10-CM

## 2020-04-27 DIAGNOSIS — I10 HYPERTENSION, UNSPECIFIED TYPE: ICD-10-CM

## 2020-04-27 RX ORDER — OMEPRAZOLE 20 MG/1
20 CAPSULE, DELAYED RELEASE ORAL DAILY
Qty: 90 CAPSULE | Refills: 1 | Status: SHIPPED | OUTPATIENT
Start: 2020-04-27 | End: 2020-10-26 | Stop reason: SDUPTHER

## 2020-04-27 RX ORDER — AMLODIPINE BESYLATE 10 MG/1
TABLET ORAL
Qty: 135 TABLET | Refills: 1 | Status: SHIPPED | OUTPATIENT
Start: 2020-04-27 | End: 2020-10-26 | Stop reason: SDUPTHER

## 2020-06-16 ENCOUNTER — TELEPHONE (OUTPATIENT)
Dept: INTERNAL MEDICINE CLINIC | Facility: CLINIC | Age: 85
End: 2020-06-16

## 2020-06-18 DIAGNOSIS — I10 HYPERTENSION, UNSPECIFIED TYPE: ICD-10-CM

## 2020-06-18 RX ORDER — LEVOTHYROXINE SODIUM 0.05 MG/1
75 TABLET ORAL DAILY
Qty: 135 TABLET | Refills: 0 | Status: SHIPPED | OUTPATIENT
Start: 2020-06-18 | End: 2020-09-14 | Stop reason: SDUPTHER

## 2020-08-16 ENCOUNTER — HOSPITAL ENCOUNTER (EMERGENCY)
Facility: HOSPITAL | Age: 85
Discharge: HOME | End: 2020-08-16
Attending: EMERGENCY MEDICINE | Admitting: EMERGENCY MEDICINE
Payer: MEDICARE

## 2020-08-16 ENCOUNTER — APPOINTMENT (EMERGENCY)
Dept: NON INVASIVE DIAGNOSTICS | Facility: HOSPITAL | Age: 85
End: 2020-08-16
Payer: MEDICARE

## 2020-08-16 VITALS
RESPIRATION RATE: 16 BRPM | BODY MASS INDEX: 23.55 KG/M2 | HEART RATE: 70 BPM | DIASTOLIC BLOOD PRESSURE: 84 MMHG | OXYGEN SATURATION: 100 % | WEIGHT: 120.59 LBS | TEMPERATURE: 97.9 F | SYSTOLIC BLOOD PRESSURE: 197 MMHG

## 2020-08-16 DIAGNOSIS — T14.8XXA HEMATOMA: ICD-10-CM

## 2020-08-16 DIAGNOSIS — M79.604 RIGHT LEG PAIN: Primary | ICD-10-CM

## 2020-08-16 DIAGNOSIS — S86.119A: ICD-10-CM

## 2020-08-16 PROCEDURE — 99284 EMERGENCY DEPT VISIT MOD MDM: CPT | Performed by: EMERGENCY MEDICINE

## 2020-08-16 PROCEDURE — 99283 EMERGENCY DEPT VISIT LOW MDM: CPT

## 2020-08-16 PROCEDURE — 93971 EXTREMITY STUDY: CPT | Performed by: SURGERY

## 2020-08-16 PROCEDURE — 93971 EXTREMITY STUDY: CPT

## 2020-08-16 RX ORDER — MELATONIN
4000 DAILY
COMMUNITY
End: 2021-11-01 | Stop reason: SDUPTHER

## 2020-08-16 NOTE — ED PROVIDER NOTES
Pt Name: Cedrick Garcia  MRN: [id number]  [place] 7/9/1927  Age/Sex: 80 y o  female  Date of evaluation: 8/16/2020  PCP: Stephen Cleaning MD    [address] [place] [address]    Chief Complaint   Patient presents with    Leg Pain     Pt with approx 1 week of RLE pain, and 2 days of RLE discoloration  HPI    [facility] presents to the Emergency Department complaining of rle pain and sweling  No specific trauma  No redness or wound  No prior DVT or recent surgery/immobilization  She has a hx of H Tn and thyroid disease but is otherwise healthy  No other complaints  She is not SOB  HPI      Past Medical and Surgical History    Past Medical History:   Diagnosis Date    Disease of thyroid gland     Hypertension        Past Surgical History:   Procedure Laterality Date    HYSTERECTOMY         Family History   Problem Relation Age of Onset    Aneurysm Father        Social History     Tobacco Use    Smoking status: Current Every Day Smoker     Types: Cigarettes    Smokeless tobacco: Never Used    Tobacco comment: 6 a day   Substance Use Topics    Alcohol use: No    Drug use: No              Allergies    Allergies   Allergen Reactions    Ergocalciferol        Home Medications    Prior to Admission medications    Medication Sig Start Date End Date Taking?  Authorizing Provider   amLODIPine (NORVASC) 10 mg tablet Take 1/2 tablet in the morning and 1 tablet in the evening 4/27/20   Stephen Cleaning MD   aspirin 81 MG tablet Take 81 mg by mouth    Historical Provider, MD   citalopram (CeleXA) 10 mg tablet Take 0 5 tablets (5 mg total) by mouth daily 9/17/18   Stephen Cleaning MD   fluticasone [facility]) 50 mcg/act nasal spray 1 spray into each nostril daily 10/14/19   Stephen Cleaning MD   levothyroxine 50 mcg tablet Take 1 5 tablets (75 mcg total) by mouth daily 6/18/20   Stephen Cleaning MD   metoprolol succinate (TOPROL-XL) 200 MG 24 hr tablet Take 1 tablet (200 mg total) by mouth daily 11/29/19   Stephen Cleaning MD omeprazole (PriLOSEC) 20 mg delayed release capsule Take 1 capsule (20 mg total) by mouth daily 4/27/20   Harris Sessions, MD   simvastatin (ZOCOR) 10 mg tablet Take 1 tablet (10 mg total) by mouth daily 11/29/19   Harris Sessions, MD   spironolactone (ALDACTONE) 25 mg tablet Take 1 5 tablets (37 5 mg total) by mouth daily 3/16/20   Harris Sessions, MD           Review of Systems    Review of Systems   Constitutional: Negative for activity change, appetite change, chills, diaphoresis, fatigue and fever  HENT: Negative for congestion, postnasal drip, rhinorrhea, sinus pressure, sneezing and sore throat  Eyes: Negative for pain and visual disturbance  Respiratory: Negative for cough, chest tightness and shortness of breath  Cardiovascular: Negative for chest pain, palpitations and leg swelling  Gastrointestinal: Negative for abdominal distention, abdominal pain, constipation, diarrhea, nausea and vomiting  Endocrine: Negative for polydipsia, polyphagia and polyuria  Genitourinary: Negative for decreased urine volume, difficulty urinating, dysuria, flank pain, frequency and hematuria  Musculoskeletal: Negative for arthralgias, gait problem, joint swelling and neck pain  Skin: Negative for pallor and rash  Allergic/Immunologic: Negative for immunocompromised state  Neurological: Negative for syncope, speech difficulty, weakness, light-headedness, numbness and headaches  All other systems reviewed and are negative  Physical Exam      ED Triage Vitals   Temperature Pulse Respirations Blood Pressure SpO2   08/16/20 0854 08/16/20 0837 08/16/20 0837 08/16/20 0837 08/16/20 0837   97 9 °F (36 6 °C) 70 16 (!) 197/84 100 %      Temp Source Heart Rate Source Patient Position - Orthostatic VS BP Location FiO2 (%)   08/16/20 0854 -- 08/16/20 0837 08/16/20 0837 --   Oral  Sitting Right arm       Pain Score       08/16/20 0837       7               Physical Exam  Vitals signs and nursing note reviewed  Constitutional:       General: She is not in acute distress  Appearance: She is well-developed  She is not diaphoretic  HENT:      Head: Normocephalic and atraumatic  Nose: Nose normal    Eyes:      General: Lids are normal       Conjunctiva/sclera: Conjunctivae normal       Pupils: Pupils are equal, round, and reactive to light  Neck:      Musculoskeletal: Normal range of motion and neck supple  Cardiovascular:      Rate and Rhythm: Normal rate and regular rhythm  Heart sounds: Normal heart sounds  No murmur  No friction rub  No gallop  Pulmonary:      Effort: Pulmonary effort is normal  No accessory muscle usage or respiratory distress  Breath sounds: Normal breath sounds  No wheezing or rales  Abdominal:      General: There is no distension  Palpations: Abdomen is soft  Tenderness: There is no abdominal tenderness  There is no guarding or rebound  Musculoskeletal:      Right lower leg: She exhibits swelling  She exhibits no tenderness and no bony tenderness  Legs:    Skin:     General: Skin is warm and dry  Findings: No erythema or rash  Neurological:      Mental Status: She is alert and oriented to person, place, and time  Cranial Nerves: No cranial nerve deficit  Sensory: No sensory deficit  Psychiatric:         Speech: Speech normal          Behavior: Behavior normal          Thought Content: Thought content normal          Judgment: Judgment normal                 Assessment and Plan    Estrella Pires is a 80 y o  female who presents with lower extremity pain and swelling  Physical examination remarkable for ecchymosis  Differential diagnosis (not completely inclusive) includes trauma/ hematoma/ DVT  Plan will be to perform diagnostic testing and treat symptomatically        MDM    Diagnostic Results    Labs:        Radiology:    VAS lower limb venous duplex study, unilateral/limited    (Results Pending)     Negative for DVT    All radiology studies independently viewed by me and interpreted by the radiologist     Procedure    Procedures      ED Course of Care and Re-Assessments      Medications - No data to display        FINAL IMPRESSION    Final diagnoses:   Right leg pain   Hematoma   Gastrocnemius muscle tear         DISPOSITION/PLAN    Time reflects when diagnosis was documented in both MDM as applicable and the Disposition within this note     Time User Action Codes Description Comment    8/16/2020 11:00 AM Elvira Bad Add [S90 970] Right leg pain     8/16/2020 11:00 AM Elvira Bad L Add Williemae Savannah  8XXA] Hematoma     8/16/2020 11:00 AM Elvira Bad Add [J90 853S] Gastrocnemius muscle tear       ED Disposition     ED Disposition Condition Date/Time Comment    Discharge Stable Sun Aug 16, 2020 11:00 AM Harvey Amador discharge to home/self care              Follow-up Information     Follow up With Specialties Details Why Contact Info Additional Information    Katerina Devlin MD Internal Medicine Schedule an appointment as soon as possible for a visit   [address]  [address] [facility]       [address] Orthopedic Surgery   [address]  [place] [address] [place] [address] [phone] Λ  [address], [address], [address] [place], [place], [address] [phone]            PATIENT REFERRED TO:    Katerina Devlin MD  [address]    Schedule an appointment as soon as possible for a visit       [address]  [address]  [address] [address] [phone]          DISCHARGE MEDICATIONS:    Discharge Medication List as of 8/16/2020 11:01 AM      CONTINUE these medications which have NOT CHANGED    Details   amLODIPine (NORVASC) 10 mg tablet Take 1/2 tablet in the morning and 1 tablet in the evening, Normal      aspirin 81 MG tablet Take 81 mg by mouth, Historical Med      cholecalciferol (VITAMIN D3) 1,000 units tablet Take 4,000 Units by mouth daily, Historical Med      levothyroxine 50 mcg tablet Take 1 5 tablets (75 mcg total) by mouth daily, Starting u 6/18/2020, Normal      metoprolol succinate (TOPROL-XL) 200 MG 24 hr tablet Take 1 tablet (200 mg total) by mouth daily, Starting Fri 11/29/2019, Normal      omeprazole (PriLOSEC) 20 mg delayed release capsule Take 1 capsule (20 mg total) by mouth daily, Starting Mon 4/27/2020, Normal      simvastatin (ZOCOR) 10 mg tablet Take 1 tablet (10 mg total) by mouth daily, Starting Fri 11/29/2019, Normal      spironolactone (ALDACTONE) 25 mg tablet Take 1 5 tablets (37 5 mg total) by mouth daily, Starting Mon 3/16/2020, Normal      citalopram (CeleXA) 10 mg tablet Take 0 5 tablets (5 mg total) by mouth daily, Starting Mon 9/17/2018, Phone In      fluticasone (FLONASE) 50 mcg/act nasal spray 1 spray into each nostril daily, Starting Mon 10/14/2019, Normal             No discharge procedures on file           Nathaly Levine, [address] [facility], DO  08/16/20 9504

## 2020-09-14 DIAGNOSIS — I10 HYPERTENSION, UNSPECIFIED TYPE: ICD-10-CM

## 2020-09-14 RX ORDER — LEVOTHYROXINE SODIUM 0.05 MG/1
75 TABLET ORAL DAILY
Qty: 135 TABLET | Refills: 0 | Status: SHIPPED | OUTPATIENT
Start: 2020-09-14 | End: 2020-11-30 | Stop reason: SDUPTHER

## 2020-09-14 RX ORDER — SPIRONOLACTONE 25 MG/1
37.5 TABLET ORAL DAILY
Qty: 135 TABLET | Refills: 1 | Status: SHIPPED | OUTPATIENT
Start: 2020-09-14 | End: 2020-11-30 | Stop reason: SDUPTHER

## 2020-10-26 DIAGNOSIS — K21.9 GASTROESOPHAGEAL REFLUX DISEASE WITHOUT ESOPHAGITIS: ICD-10-CM

## 2020-10-26 DIAGNOSIS — I10 HYPERTENSION, UNSPECIFIED TYPE: ICD-10-CM

## 2020-10-26 RX ORDER — AMLODIPINE BESYLATE 10 MG/1
TABLET ORAL
Qty: 135 TABLET | Refills: 1 | Status: SHIPPED | OUTPATIENT
Start: 2020-10-26 | End: 2021-03-20

## 2020-10-26 RX ORDER — OMEPRAZOLE 20 MG/1
20 CAPSULE, DELAYED RELEASE ORAL DAILY
Qty: 90 CAPSULE | Refills: 1 | Status: SHIPPED | OUTPATIENT
Start: 2020-10-26 | End: 2021-04-13 | Stop reason: SDUPTHER

## 2020-11-03 ENCOUNTER — TELEMEDICINE (OUTPATIENT)
Dept: INTERNAL MEDICINE CLINIC | Facility: CLINIC | Age: 85
End: 2020-11-03
Payer: MEDICARE

## 2020-11-03 DIAGNOSIS — E03.9 HYPOTHYROIDISM, UNSPECIFIED TYPE: ICD-10-CM

## 2020-11-03 DIAGNOSIS — E55.9 VITAMIN D DEFICIENCY: ICD-10-CM

## 2020-11-03 DIAGNOSIS — I10 HYPERTENSION, UNSPECIFIED TYPE: ICD-10-CM

## 2020-11-03 DIAGNOSIS — E78.5 HYPERLIPIDEMIA, UNSPECIFIED HYPERLIPIDEMIA TYPE: ICD-10-CM

## 2020-11-03 DIAGNOSIS — D47.2 MGUS (MONOCLONAL GAMMOPATHY OF UNKNOWN SIGNIFICANCE): Primary | ICD-10-CM

## 2020-11-03 PROCEDURE — 99442 PR PHYS/QHP TELEPHONE EVALUATION 11-20 MIN: CPT | Performed by: INTERNAL MEDICINE

## 2020-11-06 NOTE — PROGRESS NOTES
Virtual Brief Visit    Assessment/Plan:    Problem List Items Addressed This Visit        Endocrine    Hypothyroidism    Relevant Orders    TSH, 3rd generation       Cardiovascular and Mediastinum    Hypertension    Relevant Orders    Comprehensive metabolic panel    CBC and differential    UA (URINE) with reflex to Scope       Other    Hyperlipidemia    Relevant Orders    Lipid panel    MGUS (monoclonal gammopathy of unknown significance) - Primary    Vitamin D deficiency    Relevant Orders    Vitamin D 25 hydroxy                Reason for visit is   Chief Complaint   Patient presents with    Virtual Brief Visit        Encounter provider Josh Juarez MD    Provider located at [facility] [address]    Recent Visits  Date Type Provider Dept   11/03/20 Telemedicine Josh Juarez MD [facility] [place]   Showing recent visits within past 7 days and meeting all other requirements     Future Appointments  No visits were found meeting these conditions  Showing future appointments within next 150 days and meeting all other requirements        After connecting through telephone and patient was informed that this is not a secure, HIPAA-complaint platform  She agrees to proceed  , the patient was identified by name and date of birth  Miri Hawley was informed that this is a telemedicine visit and that the visit is being conducted through telephone call and patient was informed that this is not a secure, HIPAA-complaint platform  She agrees to proceed     My office door was closed  No one else was in the room  She acknowledged consent and understanding of privacy and security of the platform  The patient has agreed to participate and understands she can discontinue the visit at any time  Patient is aware this is a billable service  Subjective    Miri Hawley is a 80 y o  female     HPI     Past Medical History: Diagnosis Date    Disease of thyroid gland     Hypertension        Past Surgical History:   Procedure Laterality Date    HYSTERECTOMY         Current Outpatient Medications   Medication Sig Dispense Refill    amLODIPine (NORVASC) 10 mg tablet Take 1/2 tablet in the morning and 1 tablet in the evening 135 tablet 1    aspirin 81 MG tablet Take 81 mg by mouth      cholecalciferol (VITAMIN D3) 1,000 units tablet Take 4,000 Units by mouth daily      citalopram (CeleXA) 10 mg tablet Take 0 5 tablets (5 mg total) by mouth daily 15 tablet 1    fluticasone (FLONASE) 50 mcg/act nasal spray 1 spray into each nostril daily 1 Bottle 3    levothyroxine 50 mcg tablet Take 1 5 tablets (75 mcg total) by mouth daily 135 tablet 0    metoprolol succinate (TOPROL-XL) 200 MG 24 hr tablet Take 1 tablet (200 mg total) by mouth daily 90 tablet 3    omeprazole (PriLOSEC) 20 mg delayed release capsule Take 1 capsule (20 mg total) by mouth daily 90 capsule 1    simvastatin (ZOCOR) 10 mg tablet Take 1 tablet (10 mg total) by mouth daily 90 tablet 3    spironolactone (ALDACTONE) 25 mg tablet Take 1 5 tablets (37 5 mg total) by mouth daily 135 tablet 1     No current facility-administered medications for this visit  Allergies   Allergen Reactions    Ergocalciferol        Review of Systems    There were no vitals filed for this visit  I spent 15 minutes directly with the patient during this visit    [address] acknowledges that she has consented to an online visit or consultation  She understands that the online visit is based solely on information provided by her, and that, in the absence of a face-to-face physical evaluation by the physician, the diagnosis she receives is both limited and provisional in terms of accuracy and completeness  This is not intended to replace a full medical face-to-face evaluation by the physician   Maryam Dowling understands and accepts these terms

## 2020-11-18 ENCOUNTER — TELEPHONE (OUTPATIENT)
Dept: INTERNAL MEDICINE CLINIC | Facility: CLINIC | Age: 85
End: 2020-11-18

## 2020-11-27 ENCOUNTER — TELEPHONE (OUTPATIENT)
Dept: INTERNAL MEDICINE CLINIC | Facility: CLINIC | Age: 85
End: 2020-11-27

## 2020-11-30 DIAGNOSIS — E78.5 HYPERLIPIDEMIA, UNSPECIFIED HYPERLIPIDEMIA TYPE: ICD-10-CM

## 2020-11-30 DIAGNOSIS — I10 HYPERTENSION, UNSPECIFIED TYPE: ICD-10-CM

## 2020-11-30 RX ORDER — SPIRONOLACTONE 25 MG/1
37.5 TABLET ORAL DAILY
Qty: 135 TABLET | Refills: 1 | Status: SHIPPED | OUTPATIENT
Start: 2020-11-30 | End: 2021-05-24

## 2020-11-30 RX ORDER — SIMVASTATIN 10 MG
10 TABLET ORAL DAILY
Qty: 90 TABLET | Refills: 1 | Status: SHIPPED | OUTPATIENT
Start: 2020-11-30 | End: 2021-05-12 | Stop reason: SDUPTHER

## 2020-11-30 RX ORDER — METOPROLOL SUCCINATE 200 MG/1
200 TABLET, EXTENDED RELEASE ORAL DAILY
Qty: 90 TABLET | Refills: 1 | Status: SHIPPED | OUTPATIENT
Start: 2020-11-30 | End: 2021-05-12 | Stop reason: SDUPTHER

## 2020-11-30 RX ORDER — LEVOTHYROXINE SODIUM 0.05 MG/1
75 TABLET ORAL DAILY
Qty: 135 TABLET | Refills: 1 | Status: SHIPPED | OUTPATIENT
Start: 2020-11-30 | End: 2021-06-02

## 2021-03-20 DIAGNOSIS — I10 HYPERTENSION, UNSPECIFIED TYPE: ICD-10-CM

## 2021-03-20 RX ORDER — AMLODIPINE BESYLATE 10 MG/1
TABLET ORAL
Qty: 135 TABLET | Refills: 1 | Status: SHIPPED | OUTPATIENT
Start: 2021-03-20 | End: 2021-07-12 | Stop reason: SDUPTHER

## 2021-04-07 ENCOUNTER — TELEMEDICINE (OUTPATIENT)
Dept: INTERNAL MEDICINE CLINIC | Facility: CLINIC | Age: 86
End: 2021-04-07
Payer: MEDICARE

## 2021-04-07 DIAGNOSIS — I10 HYPERTENSION, UNSPECIFIED TYPE: Primary | ICD-10-CM

## 2021-04-07 PROCEDURE — 99213 OFFICE O/P EST LOW 20 MIN: CPT | Performed by: INTERNAL MEDICINE

## 2021-04-07 NOTE — PROGRESS NOTES
Virtual Brief Visit    Assessment/Plan:    Problem List Items Addressed This Visit        Cardiovascular and Mediastinum    Hypertension - Primary                Reason for visit is   Chief Complaint   Patient presents with    Virtual Brief Visit        Encounter provider Isamar Alvarez MD    Provider located at [facility] [address]    Recent Visits  No visits were found meeting these conditions  Showing recent visits within past 7 days and meeting all other requirements     Today's Visits  Date Type Provider Dept   04/07/21 Telemedicine Isamar Alvarez MD [facility] [place]   Showing today's visits and meeting all other requirements     Future Appointments  No visits were found meeting these conditions  Showing future appointments within next 150 days and meeting all other requirements        After connecting through telephone call and patient was informed that this is not a secure, HIPAA-compliant platform  She agrees to proceed  , the patient was identified by name and date of birth  Abdi Salomon was informed that this is a telemedicine visit and that the visit is being conducted through telephone call and patient was informed that this is not a secure, HIPAA-compliant platform  She agrees to proceed     My office door was closed  No one else was in the room  She acknowledged consent and understanding of privacy and security of the platform  The patient has agreed to participate and understands she can discontinue the visit at any time  Patient is aware this is a billable service  Subjective    Abdi Salomon is a 80 y o  female     HPI  Hawa Henderson is a delightful 80-year-old lady who up treating for hypertension and a few other things because of the [place] pandemic current advanced age we had a telephone visit rather than in-person visit stairs [place] of to have a daughter Yari who is a registered nurse who sees her almost on a daily basis she has arrange for [place] to have a roller walker which she now uses and also takes her blood pressure on a regular basis and most recently it was 148/84 Mrs  [address] feels well she really has no complaints is alert bright intelligent and sharp  Will be happy to see her in the office whenever can be arranged she did get her COVID vaccination    Past Medical History:   Diagnosis Date    Disease of thyroid gland     Hypertension        Past Surgical History:   Procedure Laterality Date    HYSTERECTOMY         Current Outpatient Medications   Medication Sig Dispense Refill    amLODIPine (NORVASC) 10 mg tablet TAKE 1/2 TABLET IN THE MORNING AND 1 TABLET IN THE EVENING 135 tablet 1    aspirin 81 MG tablet Take 81 mg by mouth      cholecalciferol (VITAMIN D3) 1,000 units tablet Take 4,000 Units by mouth daily      citalopram (CeleXA) 10 mg tablet Take 0 5 tablets (5 mg total) by mouth daily 15 tablet 1    fluticasone (FLONASE) 50 mcg/act nasal spray 1 spray into each nostril daily 1 Bottle 3    levothyroxine 50 mcg tablet Take 1 5 tablets (75 mcg total) by mouth daily 135 tablet 1    metoprolol succinate (TOPROL-XL) 200 MG 24 hr tablet Take 1 tablet (200 mg total) by mouth daily 90 tablet 1    omeprazole (PriLOSEC) 20 mg delayed release capsule Take 1 capsule (20 mg total) by mouth daily 90 capsule 1    simvastatin (ZOCOR) 10 mg tablet Take 1 tablet (10 mg total) by mouth daily 90 tablet 1    spironolactone (ALDACTONE) 25 mg tablet Take 1 5 tablets (37 5 mg total) by mouth daily 135 tablet 1     No current facility-administered medications for this visit  Allergies   Allergen Reactions    Ergocalciferol        Review of Systems    There were no vitals filed for this visit  I spent 10 minutes directly with the patient during this visit    [address] acknowledges that she has consented to an online visit or consultation   She understands that the online visit is based solely on information provided by her, and that, in the absence of a face-to-face physical evaluation by the physician, the diagnosis she receives is both limited and provisional in terms of accuracy and completeness  This is not intended to replace a full medical face-to-face evaluation by the physician  Ning Acuña understands and accepts these terms

## 2021-04-13 DIAGNOSIS — K21.9 GASTROESOPHAGEAL REFLUX DISEASE WITHOUT ESOPHAGITIS: ICD-10-CM

## 2021-04-13 RX ORDER — OMEPRAZOLE 20 MG/1
20 CAPSULE, DELAYED RELEASE ORAL DAILY
Qty: 90 CAPSULE | Refills: 1 | Status: SHIPPED | OUTPATIENT
Start: 2021-04-13 | End: 2021-07-12 | Stop reason: SDUPTHER

## 2021-05-12 DIAGNOSIS — I10 HYPERTENSION, UNSPECIFIED TYPE: ICD-10-CM

## 2021-05-12 DIAGNOSIS — E78.5 HYPERLIPIDEMIA, UNSPECIFIED HYPERLIPIDEMIA TYPE: ICD-10-CM

## 2021-05-12 RX ORDER — METOPROLOL SUCCINATE 200 MG/1
200 TABLET, EXTENDED RELEASE ORAL DAILY
Qty: 90 TABLET | Refills: 1 | Status: SHIPPED | OUTPATIENT
Start: 2021-05-12 | End: 2021-11-04

## 2021-05-12 RX ORDER — SIMVASTATIN 10 MG
10 TABLET ORAL DAILY
Qty: 90 TABLET | Refills: 1 | Status: SHIPPED | OUTPATIENT
Start: 2021-05-12 | End: 2021-08-09 | Stop reason: SDUPTHER

## 2021-05-24 DIAGNOSIS — I10 HYPERTENSION, UNSPECIFIED TYPE: ICD-10-CM

## 2021-05-24 RX ORDER — SPIRONOLACTONE 25 MG/1
37.5 TABLET ORAL DAILY
Qty: 135 TABLET | Refills: 1 | Status: SHIPPED | OUTPATIENT
Start: 2021-05-24 | End: 2021-11-04

## 2021-06-02 DIAGNOSIS — I10 HYPERTENSION, UNSPECIFIED TYPE: ICD-10-CM

## 2021-06-02 RX ORDER — LEVOTHYROXINE SODIUM 0.05 MG/1
75 TABLET ORAL DAILY
Qty: 135 TABLET | Refills: 1 | Status: SHIPPED | OUTPATIENT
Start: 2021-06-02 | End: 2021-06-08 | Stop reason: SDUPTHER

## 2021-06-08 DIAGNOSIS — I10 HYPERTENSION, UNSPECIFIED TYPE: ICD-10-CM

## 2021-06-08 RX ORDER — LEVOTHYROXINE SODIUM 0.05 MG/1
75 TABLET ORAL DAILY
Qty: 135 TABLET | Refills: 1 | Status: SHIPPED | OUTPATIENT
Start: 2021-06-08 | End: 2021-11-01 | Stop reason: SDUPTHER

## 2021-07-12 DIAGNOSIS — I10 HYPERTENSION, UNSPECIFIED TYPE: ICD-10-CM

## 2021-07-12 DIAGNOSIS — K21.9 GASTROESOPHAGEAL REFLUX DISEASE WITHOUT ESOPHAGITIS: ICD-10-CM

## 2021-07-12 RX ORDER — AMLODIPINE BESYLATE 10 MG/1
TABLET ORAL
Qty: 135 TABLET | Refills: 1 | Status: SHIPPED | OUTPATIENT
Start: 2021-07-12 | End: 2021-09-27 | Stop reason: SDUPTHER

## 2021-07-12 RX ORDER — OMEPRAZOLE 20 MG/1
20 CAPSULE, DELAYED RELEASE ORAL DAILY
Qty: 90 CAPSULE | Refills: 1 | Status: SHIPPED | OUTPATIENT
Start: 2021-07-12 | End: 2021-09-27 | Stop reason: SDUPTHER

## 2021-08-09 DIAGNOSIS — E78.5 HYPERLIPIDEMIA, UNSPECIFIED HYPERLIPIDEMIA TYPE: ICD-10-CM

## 2021-08-09 RX ORDER — SIMVASTATIN 10 MG
10 TABLET ORAL DAILY
Qty: 90 TABLET | Refills: 1 | Status: SHIPPED | OUTPATIENT
Start: 2021-08-09 | End: 2021-11-01 | Stop reason: SDUPTHER

## 2021-09-19 ENCOUNTER — HOSPITAL ENCOUNTER (EMERGENCY)
Facility: HOSPITAL | Age: 86
Discharge: HOME | End: 2021-09-19
Attending: EMERGENCY MEDICINE | Admitting: EMERGENCY MEDICINE
Payer: MEDICARE

## 2021-09-19 VITALS
BODY MASS INDEX: 22.39 KG/M2 | HEART RATE: 66 BPM | SYSTOLIC BLOOD PRESSURE: 175 MMHG | TEMPERATURE: 97.8 F | OXYGEN SATURATION: 96 % | WEIGHT: 114.64 LBS | RESPIRATION RATE: 16 BRPM | DIASTOLIC BLOOD PRESSURE: 88 MMHG

## 2021-09-19 DIAGNOSIS — Z20.822 ENCOUNTER FOR LABORATORY TESTING FOR COVID-19 VIRUS: ICD-10-CM

## 2021-09-19 DIAGNOSIS — J02.9 SORE THROAT: Primary | ICD-10-CM

## 2021-09-19 DIAGNOSIS — J31.0 RHINITIS: ICD-10-CM

## 2021-09-19 LAB — SARS-COV-2 RNA RESP QL NAA+PROBE: NEGATIVE

## 2021-09-19 PROCEDURE — 99283 EMERGENCY DEPT VISIT LOW MDM: CPT

## 2021-09-19 PROCEDURE — U0005 INFEC AGEN DETEC AMPLI PROBE: HCPCS | Performed by: EMERGENCY MEDICINE

## 2021-09-19 PROCEDURE — 99282 EMERGENCY DEPT VISIT SF MDM: CPT | Performed by: EMERGENCY MEDICINE

## 2021-09-19 PROCEDURE — U0003 INFECTIOUS AGENT DETECTION BY NUCLEIC ACID (DNA OR RNA); SEVERE ACUTE RESPIRATORY SYNDROME CORONAVIRUS 2 (SARS-COV-2) (CORONAVIRUS DISEASE [COVID-19]), AMPLIFIED PROBE TECHNIQUE, MAKING USE OF HIGH THROUGHPUT TECHNOLOGIES AS DESCRIBED BY CMS-2020-01-R: HCPCS | Performed by: EMERGENCY MEDICINE

## 2021-09-19 RX ORDER — CETIRIZINE HYDROCHLORIDE 10 MG/1
10 TABLET ORAL DAILY
Qty: 14 TABLET | Refills: 0 | Status: SHIPPED | OUTPATIENT
Start: 2021-09-19 | End: 2022-02-16

## 2021-09-19 NOTE — ED PROVIDER NOTES
History  Chief Complaint   Patient presents with    Sore Throat     sore throat and runny nose x 2-3 days  No sick contacts  81 yo F vaccinated against COVID, c/o onset of sore throat and some runny nose, without cough, no fever for 1 day  She lives in a high rise senior apt so COVID exposure is possible but she didn't know of any specific  She recalls the last time she had this, a Zpack helped  She is still smoking, but is not diagnosed with any lung problems  She has HTN, denies chest pain, shortness of breath  History provided by:  Patient  DILCIA  Presenting symptoms: rhinorrhea and sore throat    Presenting symptoms: no congestion, no cough, no fatigue and no fever    Rhinorrhea:     Severity:  Mild  Sore throat:     Severity:  Mild  Duration:  1 day  Timing:  Constant  Chronicity:  New  Associated symptoms: no wheezing    Risk factors: no sick contacts (She is vaccinated against COVID-19)        Prior to Admission Medications   Prescriptions Last Dose Informant Patient Reported? Taking?    amLODIPine (NORVASC) 10 mg tablet   No No   Si/2 TABLET IN THE MORNING, 1 TABLET IN THE EVENING   aspirin 81 MG tablet  Self Yes No   Sig: Take 81 mg by mouth   cholecalciferol (VITAMIN D3) 1,000 units tablet   Yes No   Sig: Take 4,000 Units by mouth daily   citalopram (CeleXA) 10 mg tablet  Self No No   Sig: Take 0 5 tablets (5 mg total) by mouth daily   fluticasone (FLONASE) 50 mcg/act nasal spray   No No   Si spray into each nostril daily   levothyroxine 50 mcg tablet   No No   Sig: Take 1 5 tablets (75 mcg total) by mouth daily   metoprolol succinate (TOPROL-XL) 200 MG 24 hr tablet   No No   Sig: Take 1 tablet (200 mg total) by mouth daily   omeprazole (PriLOSEC) 20 mg delayed release capsule   No No   Sig: Take 1 capsule (20 mg total) by mouth daily   simvastatin (ZOCOR) 10 mg tablet   No No   Sig: Take 1 tablet (10 mg total) by mouth daily   spironolactone (ALDACTONE) 25 mg tablet   No No   Sig: TAKE 1 5 TABLETS (37 5 MG TOTAL) BY MOUTH DAILY      Facility-Administered Medications: None       Past Medical History:   Diagnosis Date    Disease of thyroid gland     Hypertension        Past Surgical History:   Procedure Laterality Date    HYSTERECTOMY         Family History   Problem Relation Age of Onset    Aneurysm Father      I have reviewed and agree with the history as documented  E-Cigarette/Vaping    E-Cigarette Use Never User      E-Cigarette/Vaping Substances     Social History     Tobacco Use    Smoking status: Current Every Day Smoker     Types: Cigarettes    Smokeless tobacco: Never Used    Tobacco comment: 6 a day   Vaping Use    Vaping Use: Never used   Substance Use Topics    Alcohol use: No    Drug use: No       Review of Systems   Constitutional: Negative for chills, fatigue and fever  HENT: Positive for rhinorrhea and sore throat  Negative for congestion  Respiratory: Negative for cough, shortness of breath and wheezing  Gastrointestinal: Negative for abdominal pain, nausea and vomiting  All other systems reviewed and are negative  Physical Exam  Physical Exam  Vitals and nursing note reviewed  Constitutional:       Appearance: She is well-developed  She is not toxic-appearing or diaphoretic  HENT:      Head: Normocephalic and atraumatic  Right Ear: Tympanic membrane and external ear normal       Left Ear: Tympanic membrane and external ear normal       Nose: Nose normal    Eyes:      Conjunctiva/sclera: Conjunctivae normal       Pupils: Pupils are equal, round, and reactive to light  Neck:      Meningeal: Brudzinski's sign and Kernig's sign absent  Cardiovascular:      Rate and Rhythm: Normal rate and regular rhythm  Pulses: Normal pulses  Heart sounds: Normal heart sounds  No murmur heard  Pulmonary:      Effort: Pulmonary effort is normal  No tachypnea or respiratory distress  Breath sounds: Normal breath sounds  No wheezing  Abdominal:      General: Bowel sounds are normal  There is no distension  Palpations: Abdomen is soft  Abdomen is not rigid  Tenderness: There is no abdominal tenderness  There is no guarding or rebound  Musculoskeletal:         General: Normal range of motion  Cervical back: Full passive range of motion without pain, normal range of motion and neck supple  Right lower leg: No swelling  Left lower leg: No swelling  Lymphadenopathy:      Cervical: No cervical adenopathy  Skin:     General: Skin is warm and dry  Capillary Refill: Capillary refill takes less than 2 seconds  Coloration: Skin is not pale  Findings: No rash  Neurological:      Mental Status: She is alert and oriented to person, place, and time  GCS: GCS eye subscore is 4  GCS verbal subscore is 5  GCS motor subscore is 6  Cranial Nerves: No cranial nerve deficit  Sensory: No sensory deficit  Coordination: Coordination normal       Gait: Gait normal       Deep Tendon Reflexes: Reflexes are normal and symmetric  Psychiatric:         Speech: Speech normal          Behavior: Behavior normal          Thought Content:  Thought content normal          Judgment: Judgment normal          Vital Signs  ED Triage Vitals   Temperature Pulse Respirations Blood Pressure SpO2   09/19/21 1504 09/19/21 1504 09/19/21 1504 09/19/21 1504 09/19/21 1504   97 8 °F (36 6 °C) 66 16 (!) 174/73 98 %      Temp Source Heart Rate Source Patient Position - Orthostatic VS BP Location FiO2 (%)   09/19/21 1504 09/19/21 1648 09/19/21 1648 09/19/21 1648 --   Oral Monitor Sitting Left arm       Pain Score       09/19/21 1504       5           Vitals:    09/19/21 1504 09/19/21 1648   BP: (!) 174/73 (!) 175/88   Pulse: 66 66   Patient Position - Orthostatic VS:  Sitting         Visual Acuity      ED Medications  Medications - No data to display    Diagnostic Studies  Results Reviewed     Procedure Component Value Units Date/Time    Novel Coronavirus (Covid-19),PCR [facility] - 24 Hour Routine [[id number]] Collected: 09/19/21 1647    Lab Status: In process Specimen: Nares from Nose Updated: 09/19/21 1654                 No orders to display              Procedures  Procedures         ED Course                                           MDM  Number of Diagnoses or Management Options  Encounter for laboratory testing for COVID-19 virus  Rhinitis  Sore throat  Diagnosis management comments: Her symptoms are onset 1 day, c/w URI, confirming COVID testing for her apartment monitoring is the main issue  I let her know that a Marjo New is not indicated at this time, so to keep track of sypmtoms and if getting worse with fever, cough, or if symptoms persist 2 weeks, she should be reevaluated  I offered antihistamine and nasal steroid, but she said she would not use a nasal spray  She is here with her daughter who will help monitor her and is comfortable with the plan  Disposition  Final diagnoses:   Sore throat   Rhinitis   Encounter for laboratory testing for COVID-19 virus     Time reflects when diagnosis was documented in both MDM as applicable and the Disposition within this note     Time User Action Codes Description Comment    9/19/2021  4:42 PM Derl Colla Add [J02 9] Sore throat     9/19/2021  4:42 PM Derl Colla Add [J31 0] Rhinitis     9/19/2021  4:42 PM Derl Colla Add [Z20 822] Encounter for laboratory testing for COVID-19 virus       ED Disposition     ED Disposition Condition Date/Time Comment    Discharge Good Sun Sep 19, 2021  4:42 PM Adeline Calzada 61 discharge to home/self care              Follow-up Information     Follow up With Specialties Details Why Contact Info    Carlita Campbell MD Internal Medicine Call  If symptoms worsen [phone]  [facility]  49  [phone]            Discharge Medication List as of 9/19/2021  4:43 PM      START taking these medications    Details cetirizine (ZyrTEC) 10 mg tablet Take 1 tablet (10 mg total) by mouth daily for 14 days, Starting Sun 9/19/2021, Until Sun 10/3/2021, Normal         CONTINUE these medications which have NOT CHANGED    Details   amLODIPine (NORVASC) 10 mg tablet 1/2 TABLET IN THE MORNING, 1 TABLET IN THE EVENING, Normal      aspirin 81 MG tablet Take 81 mg by mouth, Historical Med      cholecalciferol (VITAMIN D3) 1,000 units tablet Take 4,000 Units by mouth daily, Historical Med      citalopram (CeleXA) 10 mg tablet Take 0 5 tablets (5 mg total) by mouth daily, Starting Mon 9/17/2018, Phone In      fluticasone (FLONASE) 50 mcg/act nasal spray 1 spray into each nostril daily, Starting Mon 10/14/2019, Normal      levothyroxine 50 mcg tablet Take 1 5 tablets (75 mcg total) by mouth daily, Starting Tue 6/8/2021, Normal      metoprolol succinate (TOPROL-XL) 200 MG 24 hr tablet Take 1 tablet (200 mg total) by mouth daily, Starting Wed 5/12/2021, Normal      omeprazole (PriLOSEC) 20 mg delayed release capsule Take 1 capsule (20 mg total) by mouth daily, Starting Mon 7/12/2021, Normal      simvastatin (ZOCOR) 10 mg tablet Take 1 tablet (10 mg total) by mouth daily, Starting Mon 8/9/2021, Normal      spironolactone (ALDACTONE) 25 mg tablet TAKE 1 5 TABLETS (37 5 MG TOTAL) BY MOUTH DAILY, Starting Mon 5/24/2021, Normal           No discharge procedures on file      PDMP Review     None          ED Provider  Electronically Signed by           Darlene Fabian MD  09/19/21 0180

## 2021-09-19 NOTE — DISCHARGE INSTRUCTIONS
Upper Respiratory Infection   WHAT YOU NEED TO KNOW:   An upper respiratory infection is also called the common cold  It is an infection that can affect your nose, throat, ears, and sinuses  For healthy people, the common cold is usually not serious and does not need special treatment  Cold symptoms are usually worst for the first 3 to 5 days  Most people get better in 7 to 14 days  You may continue to cough for 2 to 3 weeks  Colds are caused by viruses and do not get better with antibiotics  DISCHARGE INSTRUCTIONS:   Return to the emergency department if:   You have chest pain or trouble breathing  Contact your healthcare provider if:   You have a fever over 102ºF (39°C)  Your sore throat gets worse or you see white or yellow spots in your throat  Your symptoms get worse after 3 to 5 days or your cold is not better in 14 days  You have a rash anywhere on your skin  You have large, tender lumps in your neck  You have thick, green or yellow drainage from your nose  You cough up thick yellow, green, or bloody mucus  You have vomiting for more than 24 hours and cannot keep fluids down  You have a bad earache  You have questions or concerns about your condition or care  Medicines: You may need any of the following:  Decongestants  help reduce nasal congestion and help you breathe more easily  If you take decongestant pills, they may make you feel restless or cause problems with your sleep  Do not use decongestant sprays for more than a few days  Cough suppressants  help reduce coughing  Ask your healthcare provider which type of cough medicine is best for you  NSAIDs , such as ibuprofen, help decrease swelling, pain, and fever  NSAIDs can cause stomach bleeding or kidney problems in certain people  If you take blood thinner medicine, always ask your healthcare provider if NSAIDs are safe for you  Always read the medicine label and follow directions      Acetaminophen  decreases pain and fever  It is available without a doctor's order  Ask how much to take and how often to take it  Follow directions  Read the labels of all other medicines you are using to see if they also contain acetaminophen, or ask your doctor or pharmacist  Acetaminophen can cause liver damage if not taken correctly  Do not use more than 4 grams (4,000 milligrams) total of acetaminophen in one day  Take your medicine as directed  Contact your healthcare provider if you think your medicine is not helping or if you have side effects  Tell him or her if you are allergic to any medicine  Keep a list of the medicines, vitamins, and herbs you take  Include the amounts, and when and why you take them  Bring the list or the pill bottles to follow-up visits  Carry your medicine list with you in case of an emergency  Follow up with your healthcare provider as directed:  Write down your questions so you remember to ask them during your visits  Do not smoke  Prevent spreading your cold to others:   Try to stay away from other people during the first 2 to 3 days of your cold when it is more easily spread  Do not share food or drinks  Do not share hand towels with household members  Wash your hands often, especially after you blow your nose  Turn away from other people and cover your mouth and nose with a tissue when you sneeze or cough

## 2021-09-20 NOTE — QUICK NOTE
Patient called 7:50 a m  On 09/20/2021 enquiring about her COVID-19 test done yesterday  Patient was informed that the test was negative

## 2021-09-23 ENCOUNTER — OFFICE VISIT (OUTPATIENT)
Dept: INTERNAL MEDICINE CLINIC | Facility: CLINIC | Age: 86
End: 2021-09-23
Payer: MEDICARE

## 2021-09-23 VITALS
SYSTOLIC BLOOD PRESSURE: 160 MMHG | WEIGHT: 115 LBS | DIASTOLIC BLOOD PRESSURE: 70 MMHG | HEART RATE: 80 BPM | TEMPERATURE: 97.6 F | HEIGHT: 60 IN | RESPIRATION RATE: 13 BRPM | BODY MASS INDEX: 22.58 KG/M2

## 2021-09-23 DIAGNOSIS — J20.9 ACUTE BRONCHITIS, UNSPECIFIED ORGANISM: Primary | ICD-10-CM

## 2021-09-23 PROCEDURE — 99214 OFFICE O/P EST MOD 30 MIN: CPT | Performed by: INTERNAL MEDICINE

## 2021-09-23 RX ORDER — AZITHROMYCIN 250 MG/1
TABLET, FILM COATED ORAL
Qty: 6 TABLET | Refills: 0 | Status: SHIPPED | OUTPATIENT
Start: 2021-09-23 | End: 2021-09-28

## 2021-09-23 NOTE — PROGRESS NOTES
INTERNAL MEDICINE OFFICE VISIT  [facility] Internal Medicine- [place]    NAME: Merari Amador  AGE: 80 y o  SEX: female    DATE OF ENCOUNTER: 9/23/2021    Assessment and Plan     1  Acute bronchitis, unspecified organism  - symptoms of sore throat, rhinorrhea, now with nonproductive cough  She tested negative for COVID  She states that she took a Z-Gary for similar symptoms in the past and that this helped greatly  We did discuss that this is typically a viral process, I offered Tessalon Perles  She is insistent on trying a Z-Gary which I think is not unreasonable  - continue with symptomatic management  She will let me know if her symptoms do not improve  - azithromycin (Zithromax) 250 mg tablet; Take 2 tablets (500 mg total) by mouth daily for 1 day, THEN 1 tablet (250 mg total) daily for 4 days  Dispense: 6 tablet; Refill: 0          No orders of the defined types were placed in this encounter  Chief Complaint     Chief Complaint   Patient presents with    Follow-up     ER follow up    Cough     dry cough       History of Present Illness     Tawnya Castaneda is here today for follow-up after recent ER visit  She is here with her daughter     presented to the ER 9/19  She was complaining of sore throat, rhinorrhea, she was tested for COVID which came back negative  She was advised to follow-up at her PCPs office if her symptoms persisted  Today, she states that she has had an intermittent cough for the past 8 days  It is described as "harsh"  She denies any sick contacts  She states her breathing is fine  Denies any fever, nausea, vomiting    Still having some mild sinus congestion runny nose    The following portions of the patient's history were reviewed and updated as appropriate: allergies, current medications, past family history, past medical history, past social history, past surgical history and problem list     Review of Systems     10 point ROS negative except per HPI    Active Problem List Patient Active Problem List   Diagnosis    Hypertension    Hypothyroidism    Chronic kidney disease    Hyperglycemia    Hyperlipidemia    MGUS (monoclonal gammopathy of unknown significance)    Vitamin D deficiency    Anxiety       Objective     /70 (BP Location: Left arm, Patient Position: Sitting, Cuff Size: Standard)   Pulse 80   Temp 97 6 °F (36 4 °C)   Resp 13   Ht 5' (1 524 m)   Wt 52 2 kg (115 lb)   BMI 22 46 kg/m²     Physical Exam  Constitutional:       Appearance: Normal appearance  She is not ill-appearing  HENT:      Head: Normocephalic and atraumatic  Eyes:      General: No scleral icterus  Right eye: No discharge  Left eye: No discharge  Cardiovascular:      Rate and Rhythm: Normal rate and regular rhythm  Heart sounds: No murmur heard  No friction rub  Pulmonary:      Effort: Pulmonary effort is normal       Breath sounds: Normal breath sounds  No wheezing or rales  Abdominal:      General: Abdomen is flat  There is no distension  Palpations: Abdomen is soft  Tenderness: There is no abdominal tenderness  Musculoskeletal:         General: No swelling or tenderness  Skin:     General: Skin is warm and dry  Findings: No erythema  Neurological:      Mental Status: She is alert  Mental status is at baseline  Motor: No weakness  Psychiatric:         Mood and Affect: Mood normal          Behavior: Behavior normal          Pertinent Laboratory/Diagnostic Studies:  No results found      Images and diagnostics reviewed     Current Medications     Current Outpatient Medications:     aspirin 81 MG tablet, Take 81 mg by mouth, Disp: , Rfl:     cholecalciferol (VITAMIN D3) 1,000 units tablet, Take 4,000 Units by mouth daily, Disp: , Rfl:     levothyroxine 50 mcg tablet, Take 1 5 tablets (75 mcg total) by mouth daily, Disp: 135 tablet, Rfl: 1    metoprolol succinate (TOPROL-XL) 200 MG 24 hr tablet, Take 1 tablet (200 mg total) by mouth daily, Disp: 90 tablet, Rfl: 1    omeprazole (PriLOSEC) 20 mg delayed release capsule, Take 1 capsule (20 mg total) by mouth daily, Disp: 90 capsule, Rfl: 1    simvastatin (ZOCOR) 10 mg tablet, Take 1 tablet (10 mg total) by mouth daily, Disp: 90 tablet, Rfl: 1    spironolactone (ALDACTONE) 25 mg tablet, TAKE 1 5 TABLETS (37 5 MG TOTAL) BY MOUTH DAILY, Disp: 135 tablet, Rfl: 1    amLODIPine (NORVASC) 10 mg tablet, 1/2 TABLET IN THE MORNING, 1 TABLET IN THE EVENING, Disp: 135 tablet, Rfl: 1    cetirizine (ZyrTEC) 10 mg tablet, Take 1 tablet (10 mg total) by mouth daily for 14 days, Disp: 14 tablet, Rfl: 0    citalopram (CeleXA) 10 mg tablet, Take 0 5 tablets (5 mg total) by mouth daily (Patient not taking: Reported on 9/23/2021), Disp: 15 tablet, Rfl: 1    fluticasone (FLONASE) 50 mcg/act nasal spray, 1 spray into each nostril daily (Patient not taking: Reported on 9/23/2021), Disp: 1 Bottle, Rfl: 3    Health Maintenance     Health Maintenance   Topic Date Due    COVID-19 Vaccine (1) Never done    DTaP,Tdap,and Td Vaccines (1 - Tdap) Never done    Pneumococcal Vaccine: 65+ Years (1 of 2 - PPSV23) 11/10/2015    Medicare Annual Wellness Visit (AWV)  06/10/2020    Influenza Vaccine (1) 09/01/2021    Fall Risk  11/03/2021    Depression Screening  11/03/2021    BMI: Adult  09/19/2022    HIB Vaccine  Aged Out    Hepatitis B Vaccine  Aged Out    IPV Vaccine  Aged Out    Hepatitis A Vaccine  Aged Out    Meningococcal ACWY Vaccine  Aged Out    HPV Vaccine  Aged [employer] History   Administered Date(s) Administered    INFLUENZA 11/11/2004, 11/15/2006, 10/29/2018, 10/05/2019    Influenza Split High Dose Preservative Free IM 11/01/2016, 10/01/2017    Influenza, high dose seasonal 0 7 mL 10/06/2020    Influenza, seasonal, injectable 01/01/2016    Pneumococcal Conjugate 13-Valent 09/15/2015    Zoster 01/01/2013    influenza, trivalent, adjuvanted 10/05/2019       Emigdio Alvarenga Sa, ALEX O    [facility] Internal Medicine - [address]  [address], [facility] SPECIALTY [facility] [address]  [address], [address] [facility]  Office: [phone]

## 2021-09-27 DIAGNOSIS — K21.9 GASTROESOPHAGEAL REFLUX DISEASE WITHOUT ESOPHAGITIS: ICD-10-CM

## 2021-09-27 DIAGNOSIS — I10 HYPERTENSION, UNSPECIFIED TYPE: ICD-10-CM

## 2021-09-27 RX ORDER — OMEPRAZOLE 20 MG/1
20 CAPSULE, DELAYED RELEASE ORAL DAILY
Qty: 90 CAPSULE | Refills: 1 | Status: SHIPPED | OUTPATIENT
Start: 2021-09-27 | End: 2022-02-16 | Stop reason: SDUPTHER

## 2021-09-27 RX ORDER — AMLODIPINE BESYLATE 10 MG/1
TABLET ORAL
Qty: 135 TABLET | Refills: 1 | Status: SHIPPED | OUTPATIENT
Start: 2021-09-27 | End: 2022-01-31 | Stop reason: SDUPTHER

## 2021-11-01 DIAGNOSIS — E55.9 VITAMIN D DEFICIENCY: Primary | ICD-10-CM

## 2021-11-01 DIAGNOSIS — I10 HYPERTENSION, UNSPECIFIED TYPE: ICD-10-CM

## 2021-11-01 DIAGNOSIS — E78.5 HYPERLIPIDEMIA, UNSPECIFIED HYPERLIPIDEMIA TYPE: ICD-10-CM

## 2021-11-03 DIAGNOSIS — I10 HYPERTENSION, UNSPECIFIED TYPE: ICD-10-CM

## 2021-11-04 RX ORDER — SPIRONOLACTONE 25 MG/1
37.5 TABLET ORAL DAILY
Qty: 135 TABLET | Refills: 1 | Status: SHIPPED | OUTPATIENT
Start: 2021-11-04 | End: 2022-02-16 | Stop reason: SDUPTHER

## 2021-11-04 RX ORDER — METOPROLOL SUCCINATE 200 MG/1
TABLET, EXTENDED RELEASE ORAL
Qty: 90 TABLET | Refills: 1 | Status: SHIPPED | OUTPATIENT
Start: 2021-11-04 | End: 2022-02-16 | Stop reason: ALTCHOICE

## 2021-11-04 RX ORDER — METOPROLOL SUCCINATE 200 MG/1
200 TABLET, EXTENDED RELEASE ORAL DAILY
Qty: 90 TABLET | Refills: 1 | Status: SHIPPED | OUTPATIENT
Start: 2021-11-04 | End: 2022-02-16 | Stop reason: SDUPTHER

## 2021-11-04 RX ORDER — SPIRONOLACTONE 25 MG/1
37.5 TABLET ORAL DAILY
Qty: 135 TABLET | Refills: 1 | Status: SHIPPED | OUTPATIENT
Start: 2021-11-04 | End: 2022-02-16 | Stop reason: ALTCHOICE

## 2021-11-04 RX ORDER — MELATONIN
1000 4 TIMES DAILY
Qty: 120 TABLET | Refills: 0 | Status: SHIPPED | OUTPATIENT
Start: 2021-11-04 | End: 2021-11-26

## 2021-11-04 RX ORDER — SIMVASTATIN 10 MG
10 TABLET ORAL DAILY
Qty: 90 TABLET | Refills: 1 | Status: SHIPPED | OUTPATIENT
Start: 2021-11-04 | End: 2022-02-16 | Stop reason: SDUPTHER

## 2021-11-04 RX ORDER — LEVOTHYROXINE SODIUM 0.05 MG/1
75 TABLET ORAL DAILY
Qty: 135 TABLET | Refills: 1 | Status: SHIPPED | OUTPATIENT
Start: 2021-11-04 | End: 2022-02-16 | Stop reason: SDUPTHER

## 2021-11-26 DIAGNOSIS — E55.9 VITAMIN D DEFICIENCY: ICD-10-CM

## 2021-11-26 DIAGNOSIS — E78.5 HYPERLIPIDEMIA, UNSPECIFIED HYPERLIPIDEMIA TYPE: ICD-10-CM

## 2021-11-26 DIAGNOSIS — I10 HYPERTENSION, UNSPECIFIED TYPE: ICD-10-CM

## 2021-11-26 RX ORDER — MELATONIN
1000 4 TIMES DAILY
Qty: 120 TABLET | Refills: 0 | Status: SHIPPED | OUTPATIENT
Start: 2021-11-26 | End: 2021-12-15 | Stop reason: SDUPTHER

## 2021-12-15 DIAGNOSIS — I10 HYPERTENSION, UNSPECIFIED TYPE: ICD-10-CM

## 2021-12-15 DIAGNOSIS — E55.9 VITAMIN D DEFICIENCY: ICD-10-CM

## 2021-12-15 DIAGNOSIS — E78.5 HYPERLIPIDEMIA, UNSPECIFIED HYPERLIPIDEMIA TYPE: ICD-10-CM

## 2021-12-15 RX ORDER — MELATONIN
1000 4 TIMES DAILY
Qty: 120 TABLET | Refills: 0 | Status: SHIPPED | OUTPATIENT
Start: 2021-12-15 | End: 2022-01-03

## 2022-01-31 DIAGNOSIS — I10 HYPERTENSION, UNSPECIFIED TYPE: ICD-10-CM

## 2022-01-31 RX ORDER — AMLODIPINE BESYLATE 10 MG/1
TABLET ORAL
Qty: 135 TABLET | Refills: 1 | Status: SHIPPED | OUTPATIENT
Start: 2022-01-31 | End: 2022-02-16 | Stop reason: SDUPTHER

## 2022-02-16 ENCOUNTER — OFFICE VISIT (OUTPATIENT)
Dept: INTERNAL MEDICINE CLINIC | Facility: CLINIC | Age: 87
End: 2022-02-16
Payer: MEDICARE

## 2022-02-16 VITALS
HEIGHT: 60 IN | RESPIRATION RATE: 18 BRPM | TEMPERATURE: 97.3 F | WEIGHT: 117 LBS | DIASTOLIC BLOOD PRESSURE: 68 MMHG | OXYGEN SATURATION: 96 % | BODY MASS INDEX: 22.97 KG/M2 | HEART RATE: 67 BPM | SYSTOLIC BLOOD PRESSURE: 130 MMHG

## 2022-02-16 DIAGNOSIS — K21.9 GASTROESOPHAGEAL REFLUX DISEASE WITHOUT ESOPHAGITIS: ICD-10-CM

## 2022-02-16 DIAGNOSIS — I10 HYPERTENSION, UNSPECIFIED TYPE: ICD-10-CM

## 2022-02-16 DIAGNOSIS — E03.9 HYPOTHYROIDISM, UNSPECIFIED TYPE: Primary | ICD-10-CM

## 2022-02-16 DIAGNOSIS — E55.9 VITAMIN D DEFICIENCY: ICD-10-CM

## 2022-02-16 DIAGNOSIS — E78.5 HYPERLIPIDEMIA, UNSPECIFIED HYPERLIPIDEMIA TYPE: ICD-10-CM

## 2022-02-16 PROCEDURE — 1123F ACP DISCUSS/DSCN MKR DOCD: CPT | Performed by: INTERNAL MEDICINE

## 2022-02-16 PROCEDURE — 99214 OFFICE O/P EST MOD 30 MIN: CPT | Performed by: INTERNAL MEDICINE

## 2022-02-16 PROCEDURE — G0438 PPPS, INITIAL VISIT: HCPCS | Performed by: INTERNAL MEDICINE

## 2022-02-16 RX ORDER — METOPROLOL SUCCINATE 200 MG/1
200 TABLET, EXTENDED RELEASE ORAL DAILY
Qty: 90 TABLET | Refills: 1 | Status: SHIPPED | OUTPATIENT
Start: 2022-02-16

## 2022-02-16 RX ORDER — SPIRONOLACTONE 25 MG/1
37.5 TABLET ORAL DAILY
Qty: 135 TABLET | Refills: 1 | Status: SHIPPED | OUTPATIENT
Start: 2022-02-16 | End: 2022-04-26 | Stop reason: SDUPTHER

## 2022-02-16 RX ORDER — OMEPRAZOLE 20 MG/1
20 CAPSULE, DELAYED RELEASE ORAL DAILY PRN
Qty: 90 CAPSULE | Refills: 0 | Status: SHIPPED | OUTPATIENT
Start: 2022-02-16 | End: 2022-04-27

## 2022-02-16 RX ORDER — SIMVASTATIN 10 MG
10 TABLET ORAL DAILY
Qty: 90 TABLET | Refills: 1 | Status: SHIPPED | OUTPATIENT
Start: 2022-02-16

## 2022-02-16 RX ORDER — VITAMIN B COMPLEX
2000 TABLET ORAL DAILY
Qty: 180 TABLET | Refills: 1 | Status: SHIPPED | OUTPATIENT
Start: 2022-02-16 | End: 2022-08-01 | Stop reason: SDUPTHER

## 2022-02-16 RX ORDER — LEVOTHYROXINE SODIUM 0.05 MG/1
75 TABLET ORAL DAILY
Qty: 135 TABLET | Refills: 1 | Status: SHIPPED | OUTPATIENT
Start: 2022-02-16 | End: 2022-06-20

## 2022-02-16 RX ORDER — AMLODIPINE BESYLATE 10 MG/1
TABLET ORAL
Qty: 135 TABLET | Refills: 1 | Status: SHIPPED | OUTPATIENT
Start: 2022-02-16

## 2022-02-16 NOTE — PROGRESS NOTES
INTERNAL MEDICINE OFFICE VISIT  [facility] Internal Medicine- [place]    NAME: Alexsander Amador  AGE: 80 y o  SEX: female    DATE OF ENCOUNTER: 2/16/2022    Assessment and Plan     1  Hypertension, unspecified type  -well controlled  Continue with amlodipine 5 mg in the morning, 10 mg in the evening  -spironolactone 37 5 mg daily  -Toprol- mg daily    - amLODIPine (NORVASC) 10 mg tablet; 1/2 TABLET IN THE MORNING, 1 TABLET IN THE EVENING  Dispense: 135 tablet; Refill: 1  - cholecalciferol (VITAMIN D3) 25 mcg (1,000 units) tablet; Take 2 tablets (2,000 Units total) by mouth daily  Dispense: 180 tablet; Refill: 1  - spironolactone (ALDACTONE) 25 mg tablet; Take 1 5 tablets (37 5 mg total) by mouth daily  Dispense: 135 tablet; Refill: 1  - metoprolol succinate (TOPROL-XL) 200 MG 24 hr tablet; Take 1 tablet (200 mg total) by mouth daily  Dispense: 90 tablet; Refill: 1  - levothyroxine 50 mcg tablet; Take 1 5 tablets (75 mcg total) by mouth daily  Dispense: 135 tablet; Refill: 1    2  Hypothyroidism, unspecified type  -currently on levothyroxine 75 mcg daily  Recheck TSH  TSH was low back in November 2020 - 0 32    - TSH, 3rd generation with Free T4 reflex; Future    3  Hyperlipidemia, unspecified hyperlipidemia type  -continue with simvastatin for now  Could consider discontinuation at next visit  -she was also on aspirin for primary prevention and I recommended she discontinue this given lack of benefit at this point    - cholecalciferol (VITAMIN D3) 25 mcg (1,000 units) tablet; Take 2 tablets (2,000 Units total) by mouth daily  Dispense: 180 tablet; Refill: 1  - simvastatin (ZOCOR) 10 mg tablet; Take 1 tablet (10 mg total) by mouth daily  Dispense: 90 tablet; Refill: 1    4  Vitamin D deficiency  -continue with vitamin-D supplementation  - cholecalciferol (VITAMIN D3) 25 mcg (1,000 units) tablet; Take 2 tablets (2,000 Units total) by mouth daily  Dispense: 180 tablet; Refill: 1    5   Gastroesophageal reflux disease without esophagitis  -continue Prilosec as needed  Stable       Lizeth Rios comes in today for follow-up  She has a medical history of hypertension, hypothyroidism, GERD, hyperlipidemia  She is here with her daughter, Haven Garcia who is an RN  John Vasquez is living independently in seems to be doing quite well  Yari lives about a mile and assists as needed  Vue has been getting around okay using rolling walker  Appetite is good  She is not having any significant GI upset, issues with her bowel movements, no GI bleeding  Denies any chest pain or issues with her breathing          No orders of the defined types were placed in this encounter  Chief Complaint     No chief complaint on file  History of Present Illness         The following portions of the patient's history were reviewed and updated as appropriate: allergies, current medications, past family history, past medical history, past social history, past surgical history and problem list     Review of Systems     10 point ROS negative except per HPI    Active Problem List     Patient Active Problem List   Diagnosis    Hypertension    Hypothyroidism    Chronic kidney disease    Hyperglycemia    Hyperlipidemia    MGUS (monoclonal gammopathy of unknown significance)    Vitamin D deficiency    Anxiety       Objective     There were no vitals taken for this visit  Physical Exam  Constitutional:       Appearance: Normal appearance  She is not ill-appearing  HENT:      Head: Normocephalic and atraumatic  Eyes:      General: No scleral icterus  Right eye: No discharge  Left eye: No discharge  Cardiovascular:      Rate and Rhythm: Normal rate and regular rhythm  Heart sounds: No murmur heard  No friction rub  Pulmonary:      Effort: Pulmonary effort is normal       Breath sounds: Normal breath sounds  No wheezing or rales  Abdominal:      General: Abdomen is flat  There is no distension        Palpations: Abdomen is soft       Tenderness: There is no abdominal tenderness  Musculoskeletal:         General: No swelling or tenderness  Skin:     General: Skin is warm and dry  Findings: No erythema  Neurological:      Mental Status: She is alert  Mental status is at baseline  Motor: No weakness  Psychiatric:         Mood and Affect: Mood normal          Behavior: Behavior normal          Pertinent Laboratory/Diagnostic Studies:  No results found      Images and diagnostics reviewed     Current Medications     Current Outpatient Medications:     amLODIPine (NORVASC) 10 mg tablet, 1/2 TABLET IN THE MORNING, 1 TABLET IN THE EVENING, Disp: 135 tablet, Rfl: 1    aspirin 81 MG tablet, Take 81 mg by mouth, Disp: , Rfl:     cetirizine (ZyrTEC) 10 mg tablet, Take 1 tablet (10 mg total) by mouth daily for 14 days, Disp: 14 tablet, Rfl: 0    cholecalciferol (VITAMIN D3) 25 mcg (1,000 units) tablet, TAKE 1 TABLET (1,000 UNITS TOTAL) BY MOUTH 4 (FOUR) TIMES A DAY, Disp: 360 tablet, Rfl: 0    citalopram (CeleXA) 10 mg tablet, Take 0 5 tablets (5 mg total) by mouth daily (Patient not taking: Reported on 9/23/2021), Disp: 15 tablet, Rfl: 1    fluticasone (FLONASE) 50 mcg/act nasal spray, 1 spray into each nostril daily (Patient not taking: Reported on 9/23/2021), Disp: 1 Bottle, Rfl: 3    levothyroxine 50 mcg tablet, Take 1 5 tablets (75 mcg total) by mouth daily, Disp: 135 tablet, Rfl: 1    metoprolol succinate (TOPROL-XL) 200 MG 24 hr tablet, Take 1 tablet (200 mg total) by mouth daily, Disp: 90 tablet, Rfl: 1    metoprolol succinate (TOPROL-XL) 200 MG 24 hr tablet, TAKE 1 TABLET BY MOUTH EVERY DAY, Disp: 90 tablet, Rfl: 1    omeprazole (PriLOSEC) 20 mg delayed release capsule, Take 1 capsule (20 mg total) by mouth daily, Disp: 90 capsule, Rfl: 1    simvastatin (ZOCOR) 10 mg tablet, Take 1 tablet (10 mg total) by mouth daily, Disp: 90 tablet, Rfl: 1    spironolactone (ALDACTONE) 25 mg tablet, Take 1 5 tablets (37 5 mg total) by mouth daily, Disp: 135 tablet, Rfl: 1    spironolactone (ALDACTONE) 25 mg tablet, TAKE 1 5 TABLETS (37 5 MG TOTAL) BY MOUTH DAILY, Disp: 135 tablet, Rfl: 1    Health Maintenance     Health Maintenance   Topic Date Due    DTaP,Tdap,and Td Vaccines (1 - Tdap) Never done    Pneumococcal Vaccine: 65+ Years (1 of 2 - PPSV23) 11/10/2015    Medicare Annual Wellness Visit (AWV)  06/10/2020    Influenza Vaccine (1) 09/01/2021    Fall Risk  11/03/2021    Depression Screening  11/03/2021    BMI: Adult  09/23/2022    COVID-19 Vaccine  Completed    HIB Vaccine  Aged Out    Hepatitis B Vaccine  Aged Out    IPV Vaccine  Aged Out    Hepatitis A Vaccine  Aged Out    Meningococcal ACWY Vaccine  Aged Out    HPV Vaccine  Aged Out     Immunization History   Administered Date(s) Administered    COVID-19 PFIZER VACCINE 0 3 ML IM 02/03/2021, 02/24/2021, 10/22/2021    INFLUENZA 11/11/2004, 11/15/2006, 10/29/2018, 10/05/2019    Influenza Split High Dose Preservative Free IM 11/01/2016, 10/01/2017    Influenza, high dose seasonal 0 7 mL 10/06/2020    Influenza, seasonal, injectable 01/01/2016    Pneumococcal Conjugate 13-Valent 09/15/2015    Zoster 01/01/2013    influenza, trivalent, adjuvanted 10/05/2019       ALEX Collins  Internal Medicine [facility]  [address], [facility] [address]  [place], [address]  Office: [phone]

## 2022-02-16 NOTE — PATIENT INSTRUCTIONS
Medicare Preventive Visit Patient Instructions  Thank you for completing your Welcome to Medicare Visit or Medicare Annual Wellness Visit today  Your next wellness visit will be due in one year (2/17/2023)  The screening/preventive services that you may require over the next 5-10 years are detailed below  Some tests may not apply to you based off risk factors and/or age  Screening tests ordered at today's visit but not completed yet may show as past due  Also, please note that scanned in results may not display below  Preventive Screenings:  Service Recommendations Previous Testing/Comments   Colorectal Cancer Screening  * Colonoscopy    * Fecal Occult Blood Test (FOBT)/Fecal Immunochemical Test (FIT)  * Fecal DNA/Cologuard Test  * Flexible Sigmoidoscopy Age: 54-65 years old   Colonoscopy: every 10 years (may be performed more frequently if at higher risk)  OR  FOBT/FIT: every 1 year  OR  Cologuard: every 3 years  OR  Sigmoidoscopy: every 5 years  Screening may be recommended earlier than age 48 if at higher risk for colorectal cancer  Also, an individualized decision between you and your healthcare provider will decide whether screening between the ages of 74-80 would be appropriate  Colonoscopy: Not on file  FOBT/FIT: Not on file  Cologuard: Not on file  Sigmoidoscopy: Not on file    Screening Not Indicated     Breast Cancer Screening Age: 36 years old  Frequency: every 1-2 years  Not required if history of left and right mastectomy Mammogram: Not on file        Cervical Cancer Screening Between the ages of 21-29, pap smear recommended once every 3 years  Between the ages of 33-67, can perform pap smear with HPV co-testing every 5 years     Recommendations may differ for women with a history of total hysterectomy, cervical cancer, or abnormal pap smears in past  Pap Smear: Not on file    Screening Not Indicated   Hepatitis C Screening Once for adults born between 1945 and 1965  More frequently in patients at high risk for Hepatitis C Hep C Antibody: Not on file        Diabetes Screening 1-2 times per year if you're at risk for diabetes or have pre-diabetes Fasting glucose: No results in last 5 years   A1C: No results in last 5 years        Cholesterol Screening Once every 5 years if you don't have a lipid disorder  May order more often based on risk factors  Lipid panel: 11/12/2020    Screening Not Indicated  History Lipid Disorder     Other Preventive Screenings Covered by Medicare:  1  Abdominal Aortic Aneurysm (AAA) Screening: covered once if your at risk  You're considered to be at risk if you have a family history of AAA  2  Lung Cancer Screening: covers low dose CT scan once per year if you meet all of the following conditions: (1) Age 50-69; (2) No signs or symptoms of lung cancer; (3) Current smoker or have quit smoking within the last 15 years; (4) You have a tobacco smoking history of at least 30 pack years (packs per day multiplied by number of years you smoked); (5) You get a written order from a healthcare provider  3  Glaucoma Screening: covered annually if you're considered high risk: (1) You have diabetes OR (2) Family history of glaucoma OR (3)  aged 48 and older OR (3)  American aged 72 and older  3  Osteoporosis Screening: covered every 2 years if you meet one of the following conditions: (1) You're estrogen deficient and at risk for osteoporosis based off medical history and other findings; (2) Have a vertebral abnormality; (3) On glucocorticoid therapy for more than 3 months; (4) Have primary hyperparathyroidism; (5) On osteoporosis medications and need to assess response to drug therapy  · Last bone density test (DXA Scan): Not on file  5  HIV Screening: covered annually if you're between the age of 12-76  Also covered annually if you are younger than 13 and older than 72 with risk factors for HIV infection   For pregnant patients, it is covered up to 3 times per pregnancy  Immunizations:  Immunization Recommendations   Influenza Vaccine Annual influenza vaccination during flu season is recommended for all persons aged >= 6 months who do not have contraindications   Pneumococcal Vaccine (Prevnar and Pneumovax)  * Prevnar = PCV13  * Pneumovax = PPSV23   Adults 25-60 years old: 1-3 doses may be recommended based on certain risk factors  Adults 72 years old: Prevnar (PCV13) vaccine recommended followed by Pneumovax (PPSV23) vaccine  If already received PPSV23 since turning 65, then PCV13 recommended at least one year after PPSV23 dose  Hepatitis B Vaccine 3 dose series if at intermediate or high risk (ex: diabetes, end stage renal disease, liver disease)   Tetanus (Td) Vaccine - COST NOT COVERED BY MEDICARE PART B Following completion of primary series, a booster dose should be given every 10 years to maintain immunity against tetanus  Td may also be given as tetanus wound prophylaxis  Tdap Vaccine - COST NOT COVERED BY MEDICARE PART B Recommended at least once for all adults  For pregnant patients, recommended with each pregnancy  Shingles Vaccine (Shingrix) - COST NOT COVERED BY MEDICARE PART B  2 shot series recommended in those aged 48 and above     Health Maintenance Due:  There are no preventive care reminders to display for this patient  Immunizations Due:      Topic Date Due    DTaP,Tdap,and Td Vaccines (1 - Tdap) Never done    Pneumococcal Vaccine: 65+ Years (1 of 2 - PPSV23) 11/10/2015    Influenza Vaccine (1) 09/01/2021     Advance Directives   What are advance directives? Advance directives are legal documents that state your wishes and plans for medical care  These plans are made ahead of time in case you lose your ability to make decisions for yourself  Advance directives can apply to any medical decision, such as the treatments you want, and if you want to donate organs  What are the types of advance directives?   There are many types of advance directives, and each state has rules about how to use them  You may choose a combination of any of the following:  · Living will: This is a written record of the treatment you want  You can also choose which treatments you do not want, which to limit, and which to stop at a certain time  This includes surgery, medicine, IV fluid, and tube feedings  · Durable power of  for healthcare [facility]): This is a written record that states who you want to make healthcare choices for you when you are unable to make them for yourself  This person, called a proxy, is usually a family member or a friend  You may choose more than 1 proxy  · Do not resuscitate (DNR) order:  A DNR order is used in case your heart stops beating or you stop breathing  It is a request not to have certain forms of treatment, such as CPR  A DNR order may be included in other types of advance directives  · Medical directive: This covers the care that you want if you are in a coma, near death, or unable to make decisions for yourself  You can list the treatments you want for each condition  Treatment may include pain medicine, surgery, blood transfusions, dialysis, IV or tube feedings, and a ventilator (breathing machine)  · Values history: This document has questions about your views, beliefs, and how you feel and think about life  This information can help others choose the care that you would choose  Why are advance directives important? An advance directive helps you control your care  Although spoken wishes may be used, it is better to have your wishes written down  Spoken wishes can be misunderstood, or not followed  Treatments may be given even if you do not want them  An advance directive may make it easier for your family to make difficult choices about your care  Urinary Incontinence   Urinary incontinence (UI)  is when you lose control of your bladder  UI develops because your bladder cannot store or empty urine properly   The 3 most common types of UI are stress incontinence, urge incontinence, or both  Medicines:   · May be given to help strengthen your bladder control  Report any side effects of medication to your healthcare provider  Do pelvic muscle exercises often:  Your pelvic muscles help you stop urinating  Squeeze these muscles tight for 5 seconds, then relax for 5 seconds  Gradually work up to squeezing for 10 seconds  Do 3 sets of 15 repetitions a day, or as directed  This will help strengthen your pelvic muscles and improve bladder control  Train your bladder:  Go to the bathroom at set times, such as every 2 hours, even if you do not feel the urge to go  You can also try to hold your urine when you feel the urge to go  For example, hold your urine for 5 minutes when you feel the urge to go  As that becomes easier, hold your urine for 10 minutes  Self-care:   · Keep a UI record  Write down how often you leak urine and how much you leak  Make a note of what you were doing when you leaked urine  · Drink liquids as directed  You may need to limit the amount of liquid you drink to help control your urine leakage  Do not drink any liquid right before you go to bed  Limit or do not have drinks that contain caffeine or alcohol  · Prevent constipation  Eat a variety of high-fiber foods  Good examples are high-fiber cereals, beans, vegetables, and whole-grain breads  Walking is the best way to trigger your intestines to have a bowel movement  · Exercise regularly and maintain a healthy weight  Weight loss and exercise will decrease pressure on your bladder and help you control your leakage  · Use a catheter as directed  to help empty your bladder  A catheter is a tiny, plastic tube that is put into your bladder to drain your urine  · Go to behavior therapy as directed  Behavior therapy may be used to help you learn to control your urge to urinate      Cigarette Smoking and Your Health   Risks to your health if you smoke: Nicotine and other chemicals found in tobacco damage every cell in your body  Even if you are a light smoker, you have an increased risk for cancer, heart disease, and lung disease  If you are pregnant or have diabetes, smoking increases your risk for complications  Benefits to your health if you stop smoking:   · You decrease respiratory symptoms such as coughing, wheezing, and shortness of breath  · You reduce your risk for cancers of the lung, mouth, throat, kidney, bladder, pancreas, stomach, and cervix  If you already have cancer, you increase the benefits of chemotherapy  You also reduce your risk for cancer returning or a second cancer from developing  · You reduce your risk for heart disease, blood clots, heart attack, and stroke  · You reduce your risk for lung infections, and diseases such as pneumonia, asthma, chronic bronchitis, and emphysema  · Your circulation improves  More oxygen can be delivered to your body  If you have diabetes, you lower your risk for complications, such as kidney, artery, and eye diseases  You also lower your risk for nerve damage  Nerve damage can lead to amputations, poor vision, and blindness  · You improve your body's ability to heal and to fight infections  For more information and support to stop smoking:   · Smokefree  gov  Phone: 5- 307 - 728-4437  Web Address: www [employer]  [address] [place], [place] [address]canelo 2018 Information is for End User's use only and may not be sold, redistributed or otherwise used for commercial purposes   All illustrations and images included in CareNotes® are the copyrighted property of [employer]  or [address] [place] [address]

## 2022-02-16 NOTE — PROGRESS NOTES
Assessment and Plan:     Problem List Items Addressed This Visit        Endocrine    Hypothyroidism - Primary    Relevant Medications    metoprolol succinate (TOPROL-XL) 200 MG 24 hr tablet    levothyroxine 50 mcg tablet    Other Relevant Orders    TSH, 3rd generation with Free T4 reflex       Cardiovascular and Mediastinum    Hypertension    Relevant Medications    amLODIPine (NORVASC) 10 mg tablet    cholecalciferol (VITAMIN D3) 25 mcg (1,000 units) tablet    spironolactone (ALDACTONE) 25 mg tablet    metoprolol succinate (TOPROL-XL) 200 MG 24 hr tablet    levothyroxine 50 mcg tablet       Other    Hyperlipidemia    Relevant Medications    cholecalciferol (VITAMIN D3) 25 mcg (1,000 units) tablet    simvastatin (ZOCOR) 10 mg tablet    Vitamin D deficiency    Relevant Medications    cholecalciferol (VITAMIN D3) 25 mcg (1,000 units) tablet      Other Visit Diagnoses     Gastroesophageal reflux disease without esophagitis        Relevant Medications    omeprazole (PriLOSEC) 20 mg delayed release capsule           Preventive health issues were discussed with patient, and age appropriate screening tests were ordered as noted in patient's After Visit Summary  Personalized health advice and appropriate referrals for health education or preventive services given if needed, as noted in patient's After Visit Summary       History of Present Illness:     Patient presents for Medicare Annual Wellness visit    Patient Care Team:  Emigdio Coles DO as PCP - General (Internal Medicine)     Problem List:     Patient Active Problem List   Diagnosis    Hypertension    Hypothyroidism    Chronic kidney disease    Hyperglycemia    Hyperlipidemia    MGUS (monoclonal gammopathy of unknown significance)    Vitamin D deficiency    Anxiety      Past Medical and Surgical History:     Past Medical History:   Diagnosis Date    Disease of thyroid gland     Hypertension      Past Surgical History:   Procedure Laterality Date    HYSTERECTOMY        Family History:     Family History   Problem Relation Age of Onset    Aneurysm Father       Social History:     Social History     Socioeconomic History    Marital status:       Spouse name: None    Number of children: None    Years of education: None    Highest education level: None   Occupational History    None   Tobacco Use    Smoking status: Current Every Day Smoker     Packs/day: 0 50     Years: 75 00     Pack years: 37 50     Types: Cigarettes    Smokeless tobacco: Never Used    Tobacco comment: 6 a day   Vaping Use    Vaping Use: Never used   Substance and Sexual Activity    Alcohol use: No    Drug use: No    Sexual activity: None   Other Topics Concern    None   Social History Narrative    None     Social Determinants of Health     Financial Resource Strain: Not on file   Food Insecurity: Not on file   Transportation Needs: Not on file   Physical Activity: Not on file   Stress: Not on file   Social Connections: Not on file   Intimate Partner Violence: Not on file   Housing Stability: Not on file      Medications and Allergies:     Current Outpatient Medications   Medication Sig Dispense Refill    amLODIPine (NORVASC) 10 mg tablet 1/2 TABLET IN THE MORNING, 1 TABLET IN THE EVENING 135 tablet 1    cholecalciferol (VITAMIN D3) 25 mcg (1,000 units) tablet Take 2 tablets (2,000 Units total) by mouth daily 180 tablet 1    levothyroxine 50 mcg tablet Take 1 5 tablets (75 mcg total) by mouth daily 135 tablet 1    metoprolol succinate (TOPROL-XL) 200 MG 24 hr tablet Take 1 tablet (200 mg total) by mouth daily 90 tablet 1    omeprazole (PriLOSEC) 20 mg delayed release capsule Take 1 capsule (20 mg total) by mouth daily as needed (For reflux) 90 capsule 0    simvastatin (ZOCOR) 10 mg tablet Take 1 tablet (10 mg total) by mouth daily 90 tablet 1    spironolactone (ALDACTONE) 25 mg tablet Take 1 5 tablets (37 5 mg total) by mouth daily 135 tablet 1     No current facility-administered medications for this visit  Allergies   Allergen Reactions    Ergocalciferol       Immunizations:     Immunization History   Administered Date(s) Administered    COVID-19 PFIZER VACCINE 0 3 ML IM 02/03/2021, 02/24/2021, 10/22/2021    INFLUENZA 11/11/2004, 11/15/2006, 10/29/2018, 10/05/2019    Influenza Split High Dose Preservative Free IM 11/01/2016, 10/01/2017    Influenza, high dose seasonal 0 7 mL 10/06/2020    Influenza, seasonal, injectable 01/01/2016    Pneumococcal Conjugate 13-Valent 09/15/2015    Zoster 01/01/2013    influenza, trivalent, adjuvanted 10/05/2019      Health Maintenance: There are no preventive care reminders to display for this patient  Topic Date Due    DTaP,Tdap,and Td Vaccines (1 - Tdap) Never done    Pneumococcal Vaccine: 65+ Years (1 of 2 - PPSV23) 11/10/2015    Influenza Vaccine (1) 09/01/2021      Medicare Health Risk Assessment:     /68   Pulse 67   Temp (!) 97 3 °F (36 3 °C)   Resp 18   Ht 5' (1 524 m)   Wt 53 1 kg (117 lb)   SpO2 96%   BMI 22 85 kg/m²          Health Risk Assessment:   Patient rates overall health as very good  Patient feels that their physical health rating is slightly worse  Patient is very satisfied with their life  Eyesight was rated as slightly worse  Hearing was rated as slightly worse  Patient feels that their emotional and mental health rating is same  Patients states they are never, rarely angry  Patient states they are sometimes unusually tired/fatigued  Pain experienced in the last 7 days has been none  Patient states that she has experienced no weight loss or gain in last 6 months  Depression Screening:   PHQ-2 Score: 0      Fall Risk Screening: In the past year, patient has experienced: no history of falling in past year      Urinary Incontinence Screening:   Patient has leaked urine accidently in the last six months       Home Safety:  Patient has trouble with stairs inside or outside of their home  Patient has working smoke alarms and has working carbon monoxide detector  Home safety hazards include: none  Nutrition:   Current diet is Regular and Limited junk food  Medications:   Patient is currently taking over-the-counter supplements  OTC medications include: see medication list  Patient is able to manage medications  Activities of Daily Living (ADLs)/Instrumental Activities of Daily Living (IADLs):   Walk and transfer into and out of bed and chair?: Yes  Dress and groom yourself?: Yes    Bathe or shower yourself?: Yes    Feed yourself? Yes  Do your laundry/housekeeping?: Yes  Manage your money, pay your bills and track your expenses?: Yes  Make your own meals?: Yes    Do your own shopping?: Yes    Previous Hospitalizations:   Any hospitalizations or ED visits within the last 12 months?: No      Advance Care Planning:   Living will: Yes    Durable POA for healthcare: Yes    Advanced directive: Yes      PREVENTIVE SCREENINGS      Cardiovascular Screening:    General: Screening Not Indicated and History Lipid Disorder      Colorectal Cancer Screening:     General: Screening Not Indicated      Cervical Cancer Screening:    General: Screening Not Indicated      Lung Cancer Screening:     General: Screening Not Indicated    Screening, Brief Intervention, and Referral to Treatment (SBIRT)    Screening  Typical number of drinks in a day: 0  Typical number of drinks in a week: 0  Interpretation: Low risk drinking behavior      Single Item Drug Screening:  How often have you used an illegal drug (including marijuana) or a prescription medication for non-medical reasons in the past year? never    Single Item Drug Screen Score: 0  Interpretation: Negative screen for possible drug use disorder      Emigdio Schafer, DO

## 2022-04-26 DIAGNOSIS — I10 HYPERTENSION, UNSPECIFIED TYPE: ICD-10-CM

## 2022-04-26 RX ORDER — SPIRONOLACTONE 25 MG/1
37.5 TABLET ORAL DAILY
Qty: 135 TABLET | Refills: 1 | Status: SHIPPED | OUTPATIENT
Start: 2022-04-26

## 2022-04-27 DIAGNOSIS — K21.9 GASTROESOPHAGEAL REFLUX DISEASE WITHOUT ESOPHAGITIS: ICD-10-CM

## 2022-04-27 RX ORDER — OMEPRAZOLE 20 MG/1
20 CAPSULE, DELAYED RELEASE ORAL DAILY PRN
Qty: 90 CAPSULE | Refills: 0 | Status: SHIPPED | OUTPATIENT
Start: 2022-04-27

## 2022-06-10 ENCOUNTER — RA CDI HCC (OUTPATIENT)
Dept: OTHER | Facility: HOSPITAL | Age: 87
End: 2022-06-10

## 2022-06-10 NOTE — PROGRESS NOTES
[address]  coding opportunities       Chart reviewed, no opportunity found: CHART REVIEWED, NO OPPORTUNITY FOUND        Patients Insurance     Medicare Insurance: Medicare

## 2022-06-16 ENCOUNTER — OFFICE VISIT (OUTPATIENT)
Dept: INTERNAL MEDICINE CLINIC | Facility: CLINIC | Age: 87
End: 2022-06-16
Payer: MEDICARE

## 2022-06-16 VITALS
SYSTOLIC BLOOD PRESSURE: 128 MMHG | OXYGEN SATURATION: 96 % | HEIGHT: 60 IN | BODY MASS INDEX: 22.93 KG/M2 | HEART RATE: 56 BPM | WEIGHT: 116.8 LBS | TEMPERATURE: 96 F | DIASTOLIC BLOOD PRESSURE: 80 MMHG

## 2022-06-16 DIAGNOSIS — E55.9 VITAMIN D DEFICIENCY: ICD-10-CM

## 2022-06-16 DIAGNOSIS — E78.5 HYPERLIPIDEMIA, UNSPECIFIED HYPERLIPIDEMIA TYPE: ICD-10-CM

## 2022-06-16 DIAGNOSIS — Z01.818 PREOP EXAMINATION: Primary | ICD-10-CM

## 2022-06-16 DIAGNOSIS — I10 HYPERTENSION, UNSPECIFIED TYPE: ICD-10-CM

## 2022-06-16 DIAGNOSIS — E03.9 HYPOTHYROIDISM, UNSPECIFIED TYPE: ICD-10-CM

## 2022-06-16 PROBLEM — K21.9 GASTROESOPHAGEAL REFLUX DISEASE WITHOUT ESOPHAGITIS: Status: ACTIVE | Noted: 2022-06-16

## 2022-06-16 PROCEDURE — 99214 OFFICE O/P EST MOD 30 MIN: CPT | Performed by: INTERNAL MEDICINE

## 2022-06-16 RX ORDER — BIMATOPROST 0.01 %
DROPS OPHTHALMIC (EYE)
COMMUNITY
Start: 2022-06-06

## 2022-06-16 RX ORDER — BRINZOLAMIDE/BRIMONIDINE TARTRATE 10; 2 MG/ML; MG/ML
SUSPENSION/ DROPS OPHTHALMIC
COMMUNITY
Start: 2022-06-06

## 2022-06-16 NOTE — ASSESSMENT & PLAN NOTE
-continue with simvastatin for now    Could consider discontinuation at next visit  -she was also on aspirin for primary prevention and I recommended she discontinue this given lack of benefit at this point

## 2022-06-16 NOTE — PROGRESS NOTES
INTERNAL MEDICINE OFFICE VISIT  [facility] Internal Medicine- [place]    NAME: Kisha Amador  AGE: 80 y o  SEX: female    DATE OF ENCOUNTER: 6/16/2022    Assessment and Plan     1  Preop examination     2  Hypertension, unspecified type     3  Hypothyroidism, unspecified type  TSH, 3rd generation    T4, free   4  Hyperlipidemia, unspecified hyperlipidemia type     5  Vitamin D deficiency       Jennifer Ibrahim is here today for preop exam   Her medical condition is stable as below  Blood pressure is well controlled on current regimen  She seems to be at a relatively low risk from a cardiovascular standpoint for this procedure  She may proceed with surgery as scheduled  There is no need for further blood work or diagnostic testing from my standpoint      I asked that she go for repeat TSH and T4  Her prior TSH was suppressed and we may need to adjust her levothyroxine dose  This has no bearing on her upcoming procedure            Orders Placed This Encounter   Procedures    TSH, 3rd generation    T4, free       Chief Complaint     Chief Complaint   Patient presents with    Follow-up     4 month    Pre-op Exam     07/06/22 patient will be having eye surgery   History of Present Illness     Here today for preop clearance  She has a medical history of hypertension, hypothyroidism, GERD, hyperlipidemia    She is scheduled for procedure of the left eye with Dr Aura Guillermo in early July    She has no concerns today  She is in her usual state of health  Denies any recent chest pain, shortness of breath, dyspnea on exertion, palpitations, peripheral edema, lightheadedness, peripheral edema  She does not appear to have any history of ischemic heart disease, TIA/CVA, diabetes, CKD  She is not currently on any anti platelet agents or blood thinners  Functional status is relatively good    She ambulates with a walker      The following portions of the patient's history were reviewed and updated as appropriate: allergies, current medications, past family history, past medical history, past social history, past surgical history and problem list     Review of Systems     10 point ROS negative except per HPI    Active Problem List     Patient Active Problem List   Diagnosis    Hypertension    Hypothyroidism    Chronic kidney disease    Hyperglycemia    Hyperlipidemia    MGUS (monoclonal gammopathy of unknown significance)    Vitamin D deficiency    Anxiety    Gastroesophageal reflux disease without esophagitis       Objective     /80 (BP Location: Left arm, Patient Position: Sitting, Cuff Size: Standard)   Pulse 56   Temp (!) 96 °F (35 6 °C) (Tympanic)   Ht 5' (1 524 m)   Wt 53 kg (116 lb 12 8 oz)   SpO2 96%   BMI 22 81 kg/m²     Physical Exam  Constitutional:       Appearance: Normal appearance  She is not ill-appearing  HENT:      Head: Normocephalic and atraumatic  Eyes:      General: No scleral icterus  Right eye: No discharge  Left eye: No discharge  Cardiovascular:      Rate and Rhythm: Normal rate and regular rhythm  Heart sounds: No murmur heard  No friction rub  Pulmonary:      Effort: Pulmonary effort is normal       Breath sounds: Normal breath sounds  No wheezing or rales  Abdominal:      General: Abdomen is flat  There is no distension  Palpations: Abdomen is soft  Tenderness: There is no abdominal tenderness  Musculoskeletal:         General: No swelling or tenderness  Skin:     General: Skin is warm and dry  Findings: No erythema  Neurological:      Mental Status: She is alert  Mental status is at baseline  Motor: No weakness  Psychiatric:         Mood and Affect: Mood normal          Behavior: Behavior normal          Pertinent Laboratory/Diagnostic Studies:  No results found      Images and diagnostics reviewed     Current Medications     Current Outpatient Medications:     amLODIPine (NORVASC) 10 mg tablet, 1/2 TABLET IN THE MORNING, 1 TABLET IN THE EVENING, Disp: 135 tablet, Rfl: 1    cholecalciferol (VITAMIN D3) 25 mcg (1,000 units) tablet, Take 2 tablets (2,000 Units total) by mouth daily, Disp: 180 tablet, Rfl: 1    levothyroxine 50 mcg tablet, Take 1 5 tablets (75 mcg total) by mouth daily, Disp: 135 tablet, Rfl: 1    Lumigan 0 01 % ophthalmic drops, INSTILL 1 DROP INTO BOTH EYES AT BEDTIME, Disp: , Rfl:     metoprolol succinate (TOPROL-XL) 200 MG 24 hr tablet, Take 1 tablet (200 mg total) by mouth daily, Disp: 90 tablet, Rfl: 1    omeprazole (PriLOSEC) 20 mg delayed release capsule, TAKE 1 CAPSULE (20 MG TOTAL) BY MOUTH DAILY AS NEEDED (FOR REFLUX), Disp: 90 capsule, Rfl: 0    Simbrinza 1-0 2 % SUSP, INSTILL 1 DROP IN BOTH EYES 3 TIMES DAILY, Disp: , Rfl:     simvastatin (ZOCOR) 10 mg tablet, Take 1 tablet (10 mg total) by mouth daily, Disp: 90 tablet, Rfl: 1    spironolactone (ALDACTONE) 25 mg tablet, Take 1 5 tablets (37 5 mg total) by mouth daily, Disp: 135 tablet, Rfl: 1    Health Maintenance     Health Maintenance   Topic Date Due    DTaP,Tdap,and Td Vaccines (1 - Tdap) Never done    Pneumococcal Vaccine: 65+ Years (2 - PPSV23 or PCV20) 09/15/2016    COVID-19 Vaccine (4 - Booster for Pfizer series) 02/22/2022    Influenza Vaccine (Season Ended) 09/01/2022    Fall Risk  02/16/2023    Depression Screening  02/16/2023    Medicare Annual Wellness Visit (AWV)  02/16/2023    BMI: Adult  06/16/2023    HIB Vaccine  Aged Out    Hepatitis B Vaccine  Aged Out    IPV Vaccine  Aged Out    Hepatitis A Vaccine  Aged Out    Meningococcal ACWY Vaccine  Aged Out    HPV Vaccine  Aged Out     Immunization History   Administered Date(s) Administered    COVID-19 PFIZER VACCINE 0 3 ML IM 02/03/2021, 02/24/2021, 10/22/2021    INFLUENZA 11/11/2004, 11/15/2006, 10/29/2018, 10/05/2019    Influenza Split High Dose Preservative Free IM 11/01/2016, 10/01/2017    Influenza, high dose seasonal 0 7 mL 10/06/2020    Influenza, seasonal, injectable 01/01/2016    Pneumococcal Conjugate 13-Valent 09/15/2015    Zoster 01/01/2013    influenza, trivalent, adjuvanted 10/05/2019       ALEX Ndiaye  Internal Medicine [facility]  [address], [facility] [address]  [place], [address] [facility]  Office: [phone]

## 2022-06-16 NOTE — ASSESSMENT & PLAN NOTE
-well controlled    Continue with amlodipine 5 mg in the morning, 10 mg in the evening  -spironolactone 37 5 mg daily  -Toprol- mg daily

## 2022-06-20 DIAGNOSIS — I10 HYPERTENSION, UNSPECIFIED TYPE: ICD-10-CM

## 2022-06-20 DIAGNOSIS — E03.9 HYPOTHYROIDISM, UNSPECIFIED TYPE: Primary | ICD-10-CM

## 2022-06-20 RX ORDER — LEVOTHYROXINE SODIUM 0.05 MG/1
50 TABLET ORAL DAILY
Qty: 135 TABLET | Refills: 1
Start: 2022-06-20 | End: 2022-06-23 | Stop reason: SDUPTHER

## 2022-06-23 DIAGNOSIS — I10 HYPERTENSION, UNSPECIFIED TYPE: ICD-10-CM

## 2022-06-23 RX ORDER — LEVOTHYROXINE SODIUM 0.05 MG/1
50 TABLET ORAL DAILY
Qty: 135 TABLET | Refills: 1 | Status: SHIPPED | OUTPATIENT
Start: 2022-06-23 | End: 2022-07-27

## 2022-06-23 NOTE — TELEPHONE ENCOUNTER
----- [facility] sent at 6/20/2022  6:20 PM EDT -----  TSH is low at 0 17  This tells us we need to decrease dose of levothyroxine  Would recommend reduction from 75 mcg daily to 50 mcg daily    Can recheck thyroid labs again in another 6-8 weeks and make further adjustments from there if needed

## 2022-07-27 ENCOUNTER — CONSULT (OUTPATIENT)
Dept: INTERNAL MEDICINE CLINIC | Facility: CLINIC | Age: 87
End: 2022-07-27
Payer: MEDICARE

## 2022-07-27 VITALS
HEIGHT: 60 IN | DIASTOLIC BLOOD PRESSURE: 68 MMHG | TEMPERATURE: 96.4 F | HEART RATE: 67 BPM | OXYGEN SATURATION: 96 % | BODY MASS INDEX: 21.6 KG/M2 | RESPIRATION RATE: 18 BRPM | SYSTOLIC BLOOD PRESSURE: 136 MMHG | WEIGHT: 110 LBS

## 2022-07-27 DIAGNOSIS — H25.9 SENILE CATARACT, UNSPECIFIED AGE-RELATED CATARACT TYPE, UNSPECIFIED LATERALITY: ICD-10-CM

## 2022-07-27 DIAGNOSIS — Z01.818 PREOP EXAMINATION: Primary | ICD-10-CM

## 2022-07-27 DIAGNOSIS — E03.9 HYPOTHYROIDISM, UNSPECIFIED TYPE: ICD-10-CM

## 2022-07-27 DIAGNOSIS — I10 HYPERTENSION, UNSPECIFIED TYPE: ICD-10-CM

## 2022-07-27 DIAGNOSIS — E78.5 HYPERLIPIDEMIA, UNSPECIFIED HYPERLIPIDEMIA TYPE: ICD-10-CM

## 2022-07-27 DIAGNOSIS — K21.9 GASTROESOPHAGEAL REFLUX DISEASE WITHOUT ESOPHAGITIS: ICD-10-CM

## 2022-07-27 PROCEDURE — 99213 OFFICE O/P EST LOW 20 MIN: CPT | Performed by: INTERNAL MEDICINE

## 2022-07-27 NOTE — PROGRESS NOTES
INTERNAL MEDICINE OFFICE VISIT  [facility] Internal Medicine- [place]    NAME: Annette Amador  AGE: 80 y o  SEX: female    DATE OF ENCOUNTER: 7/27/2022    Assessment and Plan     1  Preop examination  Miguel Edwards is here today for preop exam   Her medical condition is stable as below  Blood pressure is well controlled on current regimen  She seems to be at a relatively low risk from a cardiovascular standpoint for this low risk procedure  She may proceed with surgery as scheduled  There is no need for further blood work or diagnostic testing from my standpoint       2  Senile cataract, unspecified age-related cataract type, unspecified laterality      3  Hypertension, unspecified type      4  Hypothyroidism, unspecified type      5  Gastroesophageal reflux disease without esophagitis      6  Hyperlipidemia, unspecified hyperlipidemia type               No orders of the defined types were placed in this encounter  Chief Complaint     Chief Complaint   Patient presents with    Pre-op Exam     Right eye surgery with Dr Alena Harrington on 8/3/22       History of Present Illness     Here today for preop examination  She is scheduled for cataract surgery on 08/03 with Dr Arely Reddy    She has a medical history of hypertension, hypothyroidism, GERD, hyperlipidemia    She is here today with her daughter  Patient has no acute concerns  Presently denies any chest pain, shortness of breath, dyspnea on exertion, palpitations, peripheral edema, lightheadedness  Does not appear to have any history of ischemic heart disease, TIA/CVA, diabetes, CKD  Not currently on any antiplatelets or blood thinners    Her functional status is OK, she ambulates with walker without difficulty      The following portions of the patient's history were reviewed and updated as appropriate: allergies, current medications, past family history, past medical history, past social history, past surgical history and problem list     Review of Systems 10 point ROS negative except per HPI    Active Problem List     Patient Active Problem List   Diagnosis    Hypertension    Hypothyroidism    Chronic kidney disease    Hyperglycemia    Hyperlipidemia    MGUS (monoclonal gammopathy of unknown significance)    Vitamin D deficiency    Anxiety    Gastroesophageal reflux disease without esophagitis       Objective     /68 (BP Location: Left arm, Patient Position: Sitting, Cuff Size: Standard)   Pulse 67   Temp (!) 96 4 °F (35 8 °C)   Resp 18   Ht 5' (1 524 m)   Wt 49 9 kg (110 lb)   SpO2 96%   BMI 21 48 kg/m²     Physical Exam  Constitutional:       Appearance: Normal appearance  She is not ill-appearing  HENT:      Head: Normocephalic and atraumatic  Eyes:      General: No scleral icterus  Right eye: No discharge  Left eye: No discharge  Cardiovascular:      Rate and Rhythm: Normal rate and regular rhythm  Heart sounds: No murmur heard  No friction rub  Pulmonary:      Effort: Pulmonary effort is normal       Breath sounds: Normal breath sounds  No wheezing or rales  Abdominal:      General: Abdomen is flat  There is no distension  Palpations: Abdomen is soft  Tenderness: There is no abdominal tenderness  Musculoskeletal:         General: No swelling or tenderness  Skin:     General: Skin is warm and dry  Findings: No erythema  Neurological:      Mental Status: She is alert  Mental status is at baseline  Motor: No weakness  Psychiatric:         Mood and Affect: Mood normal          Behavior: Behavior normal          Pertinent Laboratory/Diagnostic Studies:  No results found      Images and diagnostics reviewed     Current Medications     Current Outpatient Medications:     amLODIPine (NORVASC) 10 mg tablet, 1/2 TABLET IN THE MORNING, 1 TABLET IN THE EVENING, Disp: 135 tablet, Rfl: 1    cholecalciferol (VITAMIN D3) 25 mcg (1,000 units) tablet, Take 2 tablets (2,000 Units total) by mouth daily, Disp: 180 tablet, Rfl: 1    levothyroxine 50 mcg tablet, TAKE 1 1/2 TABLETS BY MOUTH DAILY, Disp: 135 tablet, Rfl: 0    Lumigan 0 01 % ophthalmic drops, INSTILL 1 DROP INTO BOTH EYES AT BEDTIME, Disp: , Rfl:     metoprolol succinate (TOPROL-XL) 200 MG 24 hr tablet, Take 1 tablet (200 mg total) by mouth daily, Disp: 90 tablet, Rfl: 1    omeprazole (PriLOSEC) 20 mg delayed release capsule, TAKE 1 CAPSULE (20 MG TOTAL) BY MOUTH DAILY AS NEEDED (FOR REFLUX), Disp: 90 capsule, Rfl: 0    Simbrinza 1-0 2 % SUSP, INSTILL 1 DROP IN BOTH EYES 3 TIMES DAILY, Disp: , Rfl:     simvastatin (ZOCOR) 10 mg tablet, Take 1 tablet (10 mg total) by mouth daily, Disp: 90 tablet, Rfl: 1    spironolactone (ALDACTONE) 25 mg tablet, Take 1 5 tablets (37 5 mg total) by mouth daily, Disp: 135 tablet, Rfl: 1    Health Maintenance     Health Maintenance   Topic Date Due    Pneumococcal Vaccine: 65+ Years (2 - PPSV23 or PCV20) 09/15/2016    COVID-19 Vaccine (4 - Booster for Pfizer series) 02/22/2022    Influenza Vaccine (1) 09/01/2022    Fall Risk  02/16/2023    Depression Screening  02/16/2023    Medicare Annual Wellness Visit (AWV)  02/16/2023    BMI: Adult  07/27/2023    HIB Vaccine  Aged Out    Hepatitis B Vaccine  Aged Out    IPV Vaccine  Aged Out    Hepatitis A Vaccine  Aged Out    Meningococcal ACWY Vaccine  Aged Out    HPV Vaccine  Aged Out     Immunization History   Administered Date(s) Administered    COVID-19 PFIZER VACCINE 0 3 ML IM 02/03/2021, 02/24/2021, 10/22/2021    INFLUENZA 11/11/2004, 11/15/2006, 10/29/2018, 10/05/2019    Influenza Split High Dose Preservative Free IM 11/01/2016, 10/01/2017    Influenza, high dose seasonal 0 7 mL 10/06/2020    Influenza, seasonal, injectable 01/01/2016    Pneumococcal Conjugate 13-Valent 09/15/2015    Zoster 01/01/2013    influenza, trivalent, adjuvanted 10/05/2019       ALEX Bowman  Internal Medicine [facility]  [address], [facility] SPECIAL[facility] [address]  [facility], [address] [facility]  Office: [phone]

## 2022-08-01 DIAGNOSIS — E78.5 HYPERLIPIDEMIA, UNSPECIFIED HYPERLIPIDEMIA TYPE: ICD-10-CM

## 2022-08-01 DIAGNOSIS — I10 HYPERTENSION, UNSPECIFIED TYPE: ICD-10-CM

## 2022-08-01 DIAGNOSIS — E55.9 VITAMIN D DEFICIENCY: ICD-10-CM

## 2022-08-01 RX ORDER — VITAMIN B COMPLEX
2000 TABLET ORAL DAILY
Qty: 180 TABLET | Refills: 1 | Status: SHIPPED | OUTPATIENT
Start: 2022-08-01 | End: 2022-10-20 | Stop reason: SDUPTHER

## 2022-08-22 DIAGNOSIS — I10 HYPERTENSION, UNSPECIFIED TYPE: ICD-10-CM

## 2022-08-22 RX ORDER — AMLODIPINE BESYLATE 10 MG/1
TABLET ORAL
Qty: 135 TABLET | Refills: 1 | Status: SHIPPED | OUTPATIENT
Start: 2022-08-22

## 2022-08-29 DIAGNOSIS — K21.9 GASTROESOPHAGEAL REFLUX DISEASE WITHOUT ESOPHAGITIS: ICD-10-CM

## 2022-08-29 RX ORDER — OMEPRAZOLE 20 MG/1
20 CAPSULE, DELAYED RELEASE ORAL DAILY PRN
Qty: 90 CAPSULE | Refills: 0 | Status: SHIPPED | OUTPATIENT
Start: 2022-08-29

## 2022-10-08 DIAGNOSIS — I10 HYPERTENSION, UNSPECIFIED TYPE: ICD-10-CM

## 2022-10-10 DIAGNOSIS — E78.5 HYPERLIPIDEMIA, UNSPECIFIED HYPERLIPIDEMIA TYPE: ICD-10-CM

## 2022-10-10 RX ORDER — METOPROLOL SUCCINATE 200 MG/1
TABLET, EXTENDED RELEASE ORAL
Qty: 90 TABLET | Refills: 1 | Status: SHIPPED | OUTPATIENT
Start: 2022-10-10

## 2022-10-10 RX ORDER — SIMVASTATIN 10 MG
TABLET ORAL
Qty: 90 TABLET | Refills: 0 | Status: SHIPPED | OUTPATIENT
Start: 2022-10-10

## 2022-10-13 ENCOUNTER — RA CDI HCC (OUTPATIENT)
Dept: OTHER | Facility: HOSPITAL | Age: 87
End: 2022-10-13

## 2022-10-14 DIAGNOSIS — I10 HYPERTENSION, UNSPECIFIED TYPE: ICD-10-CM

## 2022-10-14 RX ORDER — SPIRONOLACTONE 25 MG/1
TABLET ORAL
Qty: 135 TABLET | Refills: 1 | Status: SHIPPED | OUTPATIENT
Start: 2022-10-14 | End: 2022-10-20 | Stop reason: SDUPTHER

## 2022-10-20 ENCOUNTER — OFFICE VISIT (OUTPATIENT)
Dept: INTERNAL MEDICINE CLINIC | Facility: CLINIC | Age: 87
End: 2022-10-20
Payer: MEDICARE

## 2022-10-20 VITALS
TEMPERATURE: 97.2 F | WEIGHT: 106 LBS | BODY MASS INDEX: 20.81 KG/M2 | RESPIRATION RATE: 16 BRPM | HEART RATE: 60 BPM | SYSTOLIC BLOOD PRESSURE: 130 MMHG | HEIGHT: 60 IN | OXYGEN SATURATION: 94 % | DIASTOLIC BLOOD PRESSURE: 70 MMHG

## 2022-10-20 DIAGNOSIS — E03.9 HYPOTHYROIDISM, UNSPECIFIED TYPE: ICD-10-CM

## 2022-10-20 DIAGNOSIS — G47.62 NOCTURNAL LEG CRAMPS: ICD-10-CM

## 2022-10-20 DIAGNOSIS — K21.9 GASTROESOPHAGEAL REFLUX DISEASE WITHOUT ESOPHAGITIS: ICD-10-CM

## 2022-10-20 DIAGNOSIS — I10 HYPERTENSION, UNSPECIFIED TYPE: ICD-10-CM

## 2022-10-20 DIAGNOSIS — I10 PRIMARY HYPERTENSION: Primary | ICD-10-CM

## 2022-10-20 DIAGNOSIS — E78.5 HYPERLIPIDEMIA, UNSPECIFIED HYPERLIPIDEMIA TYPE: ICD-10-CM

## 2022-10-20 DIAGNOSIS — E55.9 VITAMIN D DEFICIENCY: ICD-10-CM

## 2022-10-20 PROCEDURE — 99214 OFFICE O/P EST MOD 30 MIN: CPT | Performed by: INTERNAL MEDICINE

## 2022-10-20 RX ORDER — SPIRONOLACTONE 25 MG/1
37.5 TABLET ORAL DAILY
Qty: 135 TABLET | Refills: 1 | Status: SHIPPED | OUTPATIENT
Start: 2022-10-20

## 2022-10-20 RX ORDER — VITAMIN B COMPLEX
2000 TABLET ORAL DAILY
Qty: 180 TABLET | Refills: 1 | Status: SHIPPED | OUTPATIENT
Start: 2022-10-20

## 2022-10-20 NOTE — ASSESSMENT & PLAN NOTE
Has been having bothersome nocturnal leg cramps  Occur around 9:00 p m  Every night can last until 4:00 a m  Elisabet Zhangos She can “walk it off” but the symptoms soon recur  Cramping sensation in her toes, calves, thighs  She does not have any significant leg swelling    Motor and sensory function grossly intact  -will check electrolyte levels, CK  -recommend she try tonic water, nightly stretching routine before bed  -can consider discontinuation of statin if symptoms persist the lower suspicion for statin induced myopathy

## 2022-10-20 NOTE — PROGRESS NOTES
INTERNAL MEDICINE OFFICE VISIT  [facility] Internal Medicine- [place]    NAME: Taylor Amador  AGE: 80 y o  SEX: female    DATE OF ENCOUNTER: 10/20/2022    Assessment and Plan/History of Present Illness     Here today for follow-up  She has a medical history of hypertension, hypothyroidism, GERD, hyperlipidemia, cataracts    1  Primary hypertension  Assessment & Plan:  -well controlled  Continue with amlodipine 5 mg in the morning, 10 mg in the evening  -spironolactone 37 5 mg daily  -Toprol- mg daily      2  Hypothyroidism, unspecified type  Assessment & Plan:  Continue levothyroxine, due for repeat TSH, labs previously requested      3  Hyperlipidemia, unspecified hyperlipidemia type  Assessment & Plan:  Continue simvastatin for now  Can consider eventual discontinuation given her advanced age    Orders:  -     cholecalciferol (VITAMIN D3) 25 mcg (1,000 units) tablet; Take 2 tablets (2,000 Units total) by mouth daily    4  Vitamin D deficiency  Assessment & Plan:  Continue supplementation    Orders:  -     cholecalciferol (VITAMIN D3) 25 mcg (1,000 units) tablet; Take 2 tablets (2,000 Units total) by mouth daily    5  Gastroesophageal reflux disease without esophagitis  Assessment & Plan:  Continue Prilosec as needed      6  Hypertension, unspecified type  Assessment & Plan:  -well controlled  Continue with amlodipine 5 mg in the morning, 10 mg in the evening  -spironolactone 37 5 mg daily  -Toprol- mg daily    Orders:  -     cholecalciferol (VITAMIN D3) 25 mcg (1,000 units) tablet; Take 2 tablets (2,000 Units total) by mouth daily  -     spironolactone (ALDACTONE) 25 mg tablet; Take 1 5 tablets (37 5 mg total) by mouth daily    7  Nocturnal leg cramps  Assessment & Plan:  Has been having bothersome nocturnal leg cramps  Occur around 9:00 p m  Every night can last until 4:00 a m  Rubens Manzano She can “walk it off” but the symptoms soon recur  Cramping sensation in her toes, calves, thighs    She does not have any significant leg swelling  Motor and sensory function grossly intact  -will check electrolyte levels, CK  -recommend she try tonic water, nightly stretching routine before bed  -can consider discontinuation of statin if symptoms persist the lower suspicion for statin induced myopathy    Orders:  -     Basic metabolic panel; Future  -     Magnesium; Future  -     CK (with reflex to MB); Future                 Orders Placed This Encounter   Procedures   • Basic metabolic panel   • Magnesium   • CK (with reflex to MB)       Chief Complaint     Chief Complaint   Patient presents with   • Follow-up     4 month; leg cramps at night with pain;        Review of Systems     10 point ROS negative except per HPI    The following portions of the patient's history were reviewed and updated as appropriate: allergies, current medications, past family history, past medical history, past social history, past surgical history and problem list     Active Problem List     Patient Active Problem List   Diagnosis   • Hypertension   • Hypothyroidism   • Chronic kidney disease   • Hyperglycemia   • Hyperlipidemia   • MGUS (monoclonal gammopathy of unknown significance)   • Vitamin D deficiency   • Anxiety   • Gastroesophageal reflux disease without esophagitis   • Nocturnal leg cramps       Objective     /70 (BP Location: Left arm, Patient Position: Sitting, Cuff Size: Adult)   Pulse 60   Temp (!) 97 2 °F (36 2 °C) (Temporal)   Resp 16   Ht 5' (1 524 m)   Wt 48 1 kg (106 lb)   SpO2 94%   BMI 20 70 kg/m²     Physical Exam  Constitutional:       Appearance: Normal appearance  She is not ill-appearing  HENT:      Head: Normocephalic and atraumatic  Eyes:      General: No scleral icterus  Right eye: No discharge  Left eye: No discharge  Cardiovascular:      Rate and Rhythm: Normal rate and regular rhythm  Heart sounds: No murmur heard  No friction rub     Pulmonary:      Effort: Pulmonary effort is normal       Breath sounds: Normal breath sounds  No wheezing or rales  Abdominal:      General: Abdomen is flat  There is no distension  Palpations: Abdomen is soft  Tenderness: There is no abdominal tenderness  Musculoskeletal:         General: No swelling or tenderness  Skin:     General: Skin is warm and dry  Findings: No erythema  Neurological:      Mental Status: She is alert  Mental status is at baseline  Motor: No weakness  Psychiatric:         Mood and Affect: Mood normal          Behavior: Behavior normal          Pertinent Laboratory/Diagnostic Studies:  No results found      Images and diagnostics reviewed     Current Medications     Current Outpatient Medications:   •  amLODIPine (NORVASC) 10 mg tablet, 1/2 TABLET IN THE MORNING, 1 TABLET IN THE EVENING, Disp: 135 tablet, Rfl: 1  •  cholecalciferol (VITAMIN D3) 25 mcg (1,000 units) tablet, Take 2 tablets (2,000 Units total) by mouth daily, Disp: 180 tablet, Rfl: 1  •  levothyroxine 50 mcg tablet, TAKE 1 1/2 TABLETS BY MOUTH DAILY, Disp: 135 tablet, Rfl: 0  •  Lumigan 0 01 % ophthalmic drops, INSTILL 1 DROP INTO BOTH EYES AT BEDTIME, Disp: , Rfl:   •  metoprolol succinate (TOPROL-XL) 200 MG 24 hr tablet, TAKE 1 TABLET BY MOUTH EVERY DAY, Disp: 90 tablet, Rfl: 1  •  omeprazole (PriLOSEC) 20 mg delayed release capsule, TAKE 1 CAPSULE (20 MG TOTAL) BY MOUTH DAILY AS NEEDED (FOR REFLUX), Disp: 90 capsule, Rfl: 0  •  Simbrinza 1-0 2 % SUSP, INSTILL 1 DROP IN BOTH EYES 3 TIMES DAILY, Disp: , Rfl:   •  simvastatin (ZOCOR) 10 mg tablet, TAKE 1 TABLET BY MOUTH EVERY DAY, Disp: 90 tablet, Rfl: 0  •  spironolactone (ALDACTONE) 25 mg tablet, Take 1 5 tablets (37 5 mg total) by mouth daily, Disp: 135 tablet, Rfl: 1    Health Maintenance     Health Maintenance   Topic Date Due   • Pneumococcal Vaccine: 65+ Years (2 - PPSV23 or PCV20) 09/15/2016   • COVID-19 Vaccine (4 - Booster for Pfizer series) 02/22/2022   • Depression Screening  02/16/2023   • Fall Risk  02/16/2023   • Urinary Incontinence Screening  02/16/2023   • Medicare Annual Wellness Visit (AWV)  02/16/2023   • BMI: Adult  10/20/2023   • Influenza Vaccine  Completed   • HIB Vaccine  Aged Out   • Hepatitis B Vaccine  Aged Out   • IPV Vaccine  Aged Out   • Hepatitis A Vaccine  Aged Out   • Meningococcal ACWY Vaccine  Aged Out   • HPV Vaccine  Aged Out     Immunization History   Administered Date(s) Administered   • COVID-19 PFIZER VACCINE 0 3 ML IM 02/03/2021, 02/24/2021, 10/22/2021   • INFLUENZA 11/11/2004, 11/15/2006, 10/29/2018, 10/05/2019, 09/21/2022   • Influenza Split High Dose Preservative Free IM 11/01/2016, 10/01/2017   • Influenza, high dose seasonal 0 7 mL 10/06/2020   • Influenza, seasonal, injectable 01/01/2016   • Pneumococcal Conjugate 13-Valent 09/15/2015   • Zoster 01/01/2013   • influenza, trivalent, adjuvanted 10/05/2019       ALEX Fischer  Internal Medicine - [place] [address], [facility] [address]  [place], [address] [facility]  Office: [phone]  Fax: [phone]

## 2022-10-26 ENCOUNTER — TELEPHONE (OUTPATIENT)
Dept: INTERNAL MEDICINE CLINIC | Facility: CLINIC | Age: 87
End: 2022-10-26

## 2022-10-26 NOTE — TELEPHONE ENCOUNTER
Patient calling this am and stated, " I had my labs done that Dr Grant Mitchell asked me get drawn on 10/21/2022    Could he give me a call on those results?"    # is [phone]

## 2022-11-02 ENCOUNTER — TELEPHONE (OUTPATIENT)
Dept: INTERNAL MEDICINE CLINIC | Facility: CLINIC | Age: 87
End: 2022-11-02

## 2022-11-02 NOTE — TELEPHONE ENCOUNTER
----- [facility] sent at 10/26/2022 10:01 AM EDT -----  No significant concerns with blood work  Electrolytes, including potassium and magnesium are within the normal range  Would not suggest starting a supplement at this time  Glucose 122, calcium 10 2  There has been some mild decline in kidney function, which is likely largely related to her age  Kidneys are still functioning appropriately    Would just recommend adequate hydration, avoid long-term use of NSAIDs like Aleve or Motrin

## 2022-11-26 DIAGNOSIS — K21.9 GASTROESOPHAGEAL REFLUX DISEASE WITHOUT ESOPHAGITIS: ICD-10-CM

## 2022-11-28 RX ORDER — OMEPRAZOLE 20 MG/1
20 CAPSULE, DELAYED RELEASE ORAL DAILY PRN
Qty: 90 CAPSULE | Refills: 0 | Status: SHIPPED | OUTPATIENT
Start: 2022-11-28

## 2022-12-06 DIAGNOSIS — I10 HYPERTENSION, UNSPECIFIED TYPE: ICD-10-CM

## 2022-12-06 RX ORDER — LEVOTHYROXINE SODIUM 0.05 MG/1
TABLET ORAL
Qty: 135 TABLET | Refills: 0 | Status: SHIPPED | OUTPATIENT
Start: 2022-12-06

## 2023-01-07 DIAGNOSIS — E78.5 HYPERLIPIDEMIA, UNSPECIFIED HYPERLIPIDEMIA TYPE: ICD-10-CM

## 2023-01-09 DIAGNOSIS — I10 HYPERTENSION, UNSPECIFIED TYPE: ICD-10-CM

## 2023-01-09 DIAGNOSIS — E78.5 HYPERLIPIDEMIA, UNSPECIFIED HYPERLIPIDEMIA TYPE: ICD-10-CM

## 2023-01-09 RX ORDER — SIMVASTATIN 10 MG
10 TABLET ORAL DAILY
Qty: 90 TABLET | Refills: 0 | Status: SHIPPED | OUTPATIENT
Start: 2023-01-09

## 2023-01-09 RX ORDER — SIMVASTATIN 10 MG
TABLET ORAL
Qty: 90 TABLET | Refills: 0 | Status: SHIPPED | OUTPATIENT
Start: 2023-01-09 | End: 2023-01-09 | Stop reason: SDUPTHER

## 2023-01-09 RX ORDER — LEVOTHYROXINE SODIUM 0.05 MG/1
75 TABLET ORAL DAILY
Qty: 135 TABLET | Refills: 0 | Status: SHIPPED | OUTPATIENT
Start: 2023-01-09

## 2023-01-17 DIAGNOSIS — I10 HYPERTENSION, UNSPECIFIED TYPE: ICD-10-CM

## 2023-01-17 RX ORDER — METOPROLOL SUCCINATE 200 MG/1
TABLET, EXTENDED RELEASE ORAL
Qty: 90 TABLET | Refills: 1 | Status: SHIPPED | OUTPATIENT
Start: 2023-01-17

## 2023-02-06 DIAGNOSIS — I10 HYPERTENSION, UNSPECIFIED TYPE: ICD-10-CM

## 2023-02-06 RX ORDER — AMLODIPINE BESYLATE 10 MG/1
TABLET ORAL
Qty: 135 TABLET | Refills: 1 | Status: SHIPPED | OUTPATIENT
Start: 2023-02-06

## 2023-02-09 DIAGNOSIS — E55.9 VITAMIN D DEFICIENCY: ICD-10-CM

## 2023-02-09 DIAGNOSIS — I10 HYPERTENSION, UNSPECIFIED TYPE: ICD-10-CM

## 2023-02-09 DIAGNOSIS — E78.5 HYPERLIPIDEMIA, UNSPECIFIED HYPERLIPIDEMIA TYPE: ICD-10-CM

## 2023-02-09 RX ORDER — VITAMIN B COMPLEX
2000 TABLET ORAL DAILY
Qty: 180 TABLET | Refills: 1 | Status: SHIPPED | OUTPATIENT
Start: 2023-02-09

## 2023-02-11 DIAGNOSIS — K21.9 GASTROESOPHAGEAL REFLUX DISEASE WITHOUT ESOPHAGITIS: ICD-10-CM

## 2023-02-13 RX ORDER — OMEPRAZOLE 20 MG/1
20 CAPSULE, DELAYED RELEASE ORAL DAILY PRN
Qty: 90 CAPSULE | Refills: 0 | Status: SHIPPED | OUTPATIENT
Start: 2023-02-13

## 2023-02-20 ENCOUNTER — OFFICE VISIT (OUTPATIENT)
Dept: INTERNAL MEDICINE CLINIC | Facility: CLINIC | Age: 88
End: 2023-02-20

## 2023-02-20 VITALS
RESPIRATION RATE: 16 BRPM | SYSTOLIC BLOOD PRESSURE: 130 MMHG | TEMPERATURE: 97.1 F | BODY MASS INDEX: 20.03 KG/M2 | HEIGHT: 60 IN | OXYGEN SATURATION: 98 % | DIASTOLIC BLOOD PRESSURE: 60 MMHG | HEART RATE: 68 BPM | WEIGHT: 102 LBS

## 2023-02-20 DIAGNOSIS — N18.32 STAGE 3B CHRONIC KIDNEY DISEASE (HCC): ICD-10-CM

## 2023-02-20 DIAGNOSIS — K21.9 GASTROESOPHAGEAL REFLUX DISEASE WITHOUT ESOPHAGITIS: Primary | ICD-10-CM

## 2023-02-20 DIAGNOSIS — I10 PRIMARY HYPERTENSION: ICD-10-CM

## 2023-02-20 DIAGNOSIS — E78.5 HYPERLIPIDEMIA, UNSPECIFIED HYPERLIPIDEMIA TYPE: ICD-10-CM

## 2023-02-20 DIAGNOSIS — R26.2 AMBULATORY DYSFUNCTION: ICD-10-CM

## 2023-02-20 DIAGNOSIS — E03.9 ACQUIRED HYPOTHYROIDISM: ICD-10-CM

## 2023-02-20 RX ORDER — TIMOLOL MALEATE 5 MG/ML
SOLUTION/ DROPS OPHTHALMIC
COMMUNITY
Start: 2023-02-06

## 2023-02-20 NOTE — ASSESSMENT & PLAN NOTE
Most recent creatinine 1 36 October 2022  Previously 0 96 in November 2020  May be new baseline    Continue to monitor

## 2023-02-20 NOTE — PROGRESS NOTES
INTERNAL MEDICINE OFFICE VISIT  [facility] Internal Medicine- [place]    NAME: Ross Amador  AGE: 80 y o  SEX: female    DATE OF ENCOUNTER: 2/20/2023    Assessment and Plan/History of Present Illness     Here today for follow up  She has a medical history of hypertension, hypothyroidism, GERD, hyperlipidemia    1  Gastroesophageal reflux disease without esophagitis  Assessment & Plan:  Stable  Continue Prilosec as needed      2  Acquired hypothyroidism  Assessment & Plan:  Prior TSH level suppressed, due for repeat thyroid studies with next set of labs      3  Primary hypertension  Assessment & Plan:  -well controlled  Continue with amlodipine 5 mg in the morning, 10 mg in the evening  -spironolactone 37 5 mg daily  -Toprol- mg daily      4  Stage 3b chronic kidney disease [facility])  Assessment & Plan:  Most recent creatinine 1 36 October 2022  Previously 0 96 in November 2020  May be new baseline  Continue to monitor      5  Hyperlipidemia, unspecified hyperlipidemia type  Assessment & Plan:  Continue simvastatin      6  Ambulatory dysfunction  Assessment & Plan:  Patient would benefit from new wheeled walker    She has issues with her balance and chronic orthopedic problems, is a fall risk, currently uses a 3 wheeled walker which is several years old    Orders:  -     [address] This Encounter   Procedures   • Ronald Faye       Chief Complaint     Chief Complaint   Patient presents with   • Follow-up     4 month        Review of Systems     10 point ROS negative except per HPI    The following portions of the patient's history were reviewed and updated as appropriate: allergies, current medications, past family history, past medical history, past social history, past surgical history and problem list     Active Problem List     Patient Active Problem List   Diagnosis   • Primary hypertension   • Acquired hypothyroidism   • Chronic kidney disease   • Hyperglycemia   • Hyperlipidemia   • MGUS (monoclonal gammopathy of unknown significance)   • Vitamin D deficiency   • Anxiety   • Gastroesophageal reflux disease without esophagitis   • Nocturnal leg cramps   • Ambulatory dysfunction       Objective     /60 (BP Location: Left arm, Patient Position: Sitting, Cuff Size: Standard)   Pulse 68   Temp (!) 97 1 °F (36 2 °C)   Resp 16   Ht 5' (1 524 m)   Wt 46 3 kg (102 lb)   SpO2 98%   BMI 19 92 kg/m²     Physical Exam  Constitutional:       Appearance: Normal appearance  She is not ill-appearing  HENT:      Head: Normocephalic and atraumatic  Eyes:      General: No scleral icterus  Right eye: No discharge  Left eye: No discharge  Cardiovascular:      Rate and Rhythm: Normal rate and regular rhythm  Heart sounds: No murmur heard  No friction rub  Pulmonary:      Effort: Pulmonary effort is normal       Breath sounds: Normal breath sounds  No wheezing or rales  Abdominal:      General: Abdomen is flat  There is no distension  Palpations: Abdomen is soft  Tenderness: There is no abdominal tenderness  Musculoskeletal:         General: No swelling or tenderness  Skin:     General: Skin is warm and dry  Findings: No erythema  Neurological:      Mental Status: She is alert  Mental status is at baseline  Motor: No weakness  Psychiatric:         Mood and Affect: Mood normal          Behavior: Behavior normal          Pertinent Laboratory/Diagnostic Studies:  No results found      Images and diagnostics reviewed     Current Medications     Current Outpatient Medications:   •  amLODIPine (NORVASC) 10 mg tablet, 1/2 TABLET IN THE MORNING, 1 TABLET IN THE EVENING, Disp: 135 tablet, Rfl: 1  •  cholecalciferol (VITAMIN D3) 25 mcg (1,000 units) tablet, Take 2 tablets (2,000 Units total) by mouth daily, Disp: 180 tablet, Rfl: 1  •  levothyroxine 50 mcg tablet, Take 1 5 tablets (75 mcg total) by mouth daily, Disp: 135 tablet, Rfl: 0  •  Lumigan 0 01 % ophthalmic drops, INSTILL 1 DROP INTO BOTH EYES AT BEDTIME, Disp: , Rfl:   •  metoprolol succinate (TOPROL-XL) 200 MG 24 hr tablet, TAKE 1 TABLET BY MOUTH EVERY DAY, Disp: 90 tablet, Rfl: 1  •  omeprazole (PriLOSEC) 20 mg delayed release capsule, TAKE 1 CAPSULE (20 MG TOTAL) BY MOUTH DAILY AS NEEDED (FOR REFLUX), Disp: 90 capsule, Rfl: 0  •  Simbrinza 1-0 2 % SUSP, INSTILL 1 DROP IN BOTH EYES 3 TIMES DAILY, Disp: , Rfl:   •  simvastatin (ZOCOR) 10 mg tablet, Take 1 tablet (10 mg total) by mouth daily, Disp: 90 tablet, Rfl: 0  •  spironolactone (ALDACTONE) 25 mg tablet, Take 1 5 tablets (37 5 mg total) by mouth daily, Disp: 135 tablet, Rfl: 1  •  timolol (TIMOPTIC) 0 5 % ophthalmic solution, INSTILL 1 DROP INTO BOTH EYES TWICE A DAY, Disp: , Rfl:     Health Maintenance     Health Maintenance   Topic Date Due   • Pneumococcal Vaccine: 65+ Years (2 - PPSV23 if available, else PCV20) 09/15/2016   • COVID-19 Vaccine (4 - Booster for Pfizer series) 12/17/2021   • Medicare Annual Wellness Visit (AWV)  02/16/2023   • Fall Risk  02/20/2024   • Depression Screening  02/20/2024   • Urinary Incontinence Screening  02/20/2024   • BMI: Adult  02/20/2024   • Influenza Vaccine  Completed   • HIB Vaccine  Aged Out   • IPV Vaccine  Aged Out   • Hepatitis A Vaccine  Aged Out   • Meningococcal ACWY Vaccine  Aged Out   • HPV Vaccine  Aged Out     Immunization History   Administered Date(s) Administered   • COVID-19 PFIZER VACCINE 0 3 ML IM 02/03/2021, 02/24/2021, 10/22/2021   • INFLUENZA 11/11/2004, 11/15/2006, 10/29/2018, 10/05/2019, 09/21/2022   • Influenza Split High Dose Preservative Free IM 11/01/2016, 10/01/2017   • Influenza, high dose seasonal 0 7 mL 10/06/2020   • Influenza, seasonal, injectable 01/01/2016   • Pneumococcal Conjugate 13-Valent 09/15/2015   • Zoster 01/01/2013   • influenza, trivalent, adjuvanted 10/05/2019       Max Marylene Silvius, D O Tavcarjeva  Internal Medicine - [place]  [address], [facility] [address] [facility] \[facility] [place] [facility]\"" - [place] [address]  [place], [address] [facility]  Office: [phone]  Fax: [phone]

## 2023-02-20 NOTE — ASSESSMENT & PLAN NOTE
Patient would benefit from new wheeled walker    She has issues with her balance and chronic orthopedic problems, is a fall risk, currently uses a 3 wheeled walker which is several years old

## 2023-05-08 ENCOUNTER — TELEPHONE (OUTPATIENT)
Dept: INTERNAL MEDICINE CLINIC | Facility: CLINIC | Age: 88
End: 2023-05-08

## 2023-05-08 DIAGNOSIS — K21.9 GASTROESOPHAGEAL REFLUX DISEASE WITHOUT ESOPHAGITIS: ICD-10-CM

## 2023-05-08 RX ORDER — OMEPRAZOLE 20 MG/1
20 CAPSULE, DELAYED RELEASE ORAL DAILY PRN
Qty: 90 CAPSULE | Refills: 0 | Status: SHIPPED | OUTPATIENT
Start: 2023-05-08

## 2023-05-08 NOTE — TELEPHONE ENCOUNTER
Pt called she stated that she wanted a refill on omeprazole but I don't see it in her medication list please advise

## 2023-06-14 ENCOUNTER — RA CDI HCC (OUTPATIENT)
Dept: OTHER | Facility: HOSPITAL | Age: 88
End: 2023-06-14

## 2023-06-26 DIAGNOSIS — I10 HYPERTENSION, UNSPECIFIED TYPE: ICD-10-CM

## 2023-06-26 RX ORDER — LEVOTHYROXINE SODIUM 0.05 MG/1
75 TABLET ORAL DAILY
Qty: 135 TABLET | Refills: 1 | Status: SHIPPED | OUTPATIENT
Start: 2023-06-26

## 2023-06-30 ENCOUNTER — OFFICE VISIT (OUTPATIENT)
Dept: INTERNAL MEDICINE CLINIC | Facility: CLINIC | Age: 88
End: 2023-06-30
Payer: MEDICARE

## 2023-06-30 VITALS
DIASTOLIC BLOOD PRESSURE: 60 MMHG | HEART RATE: 52 BPM | OXYGEN SATURATION: 98 % | WEIGHT: 102 LBS | BODY MASS INDEX: 20.03 KG/M2 | HEIGHT: 60 IN | TEMPERATURE: 97.8 F | SYSTOLIC BLOOD PRESSURE: 120 MMHG

## 2023-06-30 DIAGNOSIS — I10 PRIMARY HYPERTENSION: ICD-10-CM

## 2023-06-30 DIAGNOSIS — R26.2 AMBULATORY DYSFUNCTION: ICD-10-CM

## 2023-06-30 DIAGNOSIS — E03.9 ACQUIRED HYPOTHYROIDISM: ICD-10-CM

## 2023-06-30 DIAGNOSIS — K21.9 GASTROESOPHAGEAL REFLUX DISEASE WITHOUT ESOPHAGITIS: ICD-10-CM

## 2023-06-30 DIAGNOSIS — R79.89 ELEVATED SERUM CREATININE: ICD-10-CM

## 2023-06-30 DIAGNOSIS — E78.5 HYPERLIPIDEMIA, UNSPECIFIED HYPERLIPIDEMIA TYPE: ICD-10-CM

## 2023-06-30 DIAGNOSIS — Z00.00 MEDICARE ANNUAL WELLNESS VISIT, SUBSEQUENT: Primary | ICD-10-CM

## 2023-06-30 PROCEDURE — 99214 OFFICE O/P EST MOD 30 MIN: CPT | Performed by: INTERNAL MEDICINE

## 2023-06-30 PROCEDURE — G0439 PPPS, SUBSEQ VISIT: HCPCS | Performed by: INTERNAL MEDICINE

## 2023-06-30 NOTE — ASSESSMENT & PLAN NOTE
Most recent creatinine 1 36 October 2022  Previously 0 96 in November 2020  May be new baseline    Recheck labs prior to next follow-up

## 2023-06-30 NOTE — PROGRESS NOTES
Assessment and Plan:     Here today for follow-up  She has a medical history of hypertension, hypothyroidism, GERD, hyperlipidemia      Problem List Items Addressed This Visit        Digestive    Gastroesophageal reflux disease without esophagitis     Stable  Continue Prilosec as needed            Endocrine    Acquired hypothyroidism     Prior TSH level suppressed, due for repeat thyroid studies with next set of labs         Relevant Orders    TSH, 3rd generation with Free T4 reflex       Cardiovascular and Mediastinum    Primary hypertension     -well controlled  Continue with amlodipine 5 mg in the morning, 10 mg in the evening  -spironolactone 37 5 mg daily  -Toprol- mg daily         Relevant Orders    CBC and differential    Comprehensive metabolic panel    Lipid Panel with Direct LDL reflex       Other    Elevated serum creatinine     Most recent creatinine 1 36 October 2022  Previously 0 96 in November 2020  May be new baseline  Recheck labs prior to next follow-up         Hyperlipidemia     Continue simvastatin         Ambulatory dysfunction     Patient would benefit from new rollator walker  She has issues with balance and chronic orthopedic problems  Currently uses a 3 wheeled walker which is several years old and she had issues with a wheel several months ago         Relevant Orders    Walker   Other Visit Diagnoses     Medicare annual wellness visit, subsequent    -  Primary           Preventive health issues were discussed with patient, and age appropriate screening tests were ordered as noted in patient's After Visit Summary  Personalized health advice and appropriate referrals for health education or preventive services given if needed, as noted in patient's After Visit Summary       History of Present Illness:     Patient presents for a Medicare Wellness Visit    HPI   Patient Care Team:  Emigdio Terrazas DO as PCP - General (Internal Medicine)     Review of Systems:     Review of Systems     Problem List:     Patient Active Problem List   Diagnosis   • Primary hypertension   • Acquired hypothyroidism   • Elevated serum creatinine   • Hyperglycemia   • Hyperlipidemia   • MGUS (monoclonal gammopathy of unknown significance)   • Vitamin D deficiency   • Anxiety   • Gastroesophageal reflux disease without esophagitis   • Nocturnal leg cramps   • Ambulatory dysfunction      Past Medical and Surgical History:     Past Medical History:   Diagnosis Date   • Disease of thyroid gland    • Hypertension      Past Surgical History:   Procedure Laterality Date   • HYSTERECTOMY        Family History:     Family History   Problem Relation Age of Onset   • Aneurysm Father       Social History:     Social History     Socioeconomic History   • Marital status:      Spouse name: None   • Number of children: None   • Years of education: None   • Highest education level: None   Occupational History   • None   Tobacco Use   • Smoking status: Every Day     Packs/day: 0 50     Years: 75 00     Total pack years: 37 50     Types: Cigarettes   • Smokeless tobacco: Never   • Tobacco comments:     6 a day   Vaping Use   • Vaping Use: Never used   Substance and Sexual Activity   • Alcohol use: No   • Drug use: No   • Sexual activity: Not Currently   Other Topics Concern   • None   Social History Narrative   • None     Social Determinants of Health     Financial Resource Strain: Low Risk  (6/30/2023)    Overall Financial Resource Strain (CARDIA)    • Difficulty of Paying Living Expenses: Not very hard   Food Insecurity: Not on file   Transportation Needs: No Transportation Needs (6/30/2023)    PRAPARE - Transportation    • Lack of Transportation (Medical): No    • Lack of Transportation (Non-Medical):  No   Physical Activity: Not on file   Stress: Not on file   Social Connections: Not on file   Intimate Partner Violence: Not on file   Housing Stability: Not on file      Medications and Allergies:     Current Outpatient Medications   Medication Sig Dispense Refill   • amLODIPine (NORVASC) 10 mg tablet 1/2 TABLET IN THE MORNING, 1 TABLET IN THE EVENING 135 tablet 1   • cholecalciferol (VITAMIN D3) 25 mcg (1,000 units) tablet Take 2 tablets (2,000 Units total) by mouth daily 180 tablet 1   • levothyroxine 50 mcg tablet Take 1 5 tablets (75 mcg total) by mouth daily 135 tablet 1   • Lumigan 0 01 % ophthalmic drops INSTILL 1 DROP INTO BOTH EYES AT BEDTIME     • metoprolol succinate (TOPROL-XL) 200 MG 24 hr tablet TAKE 1 TABLET BY MOUTH EVERY DAY 90 tablet 1   • omeprazole (PriLOSEC) 20 mg delayed release capsule Take 1 capsule (20 mg total) by mouth daily as needed (For reflux) 90 capsule 0   • Simbrinza 1-0 2 % SUSP INSTILL 1 DROP IN BOTH EYES 3 TIMES DAILY     • simvastatin (ZOCOR) 10 mg tablet TAKE 1 TABLET BY MOUTH EVERY DAY 90 tablet 0   • spironolactone (ALDACTONE) 25 mg tablet Take 1 5 tablets (37 5 mg total) by mouth daily 135 tablet 1   • timolol (TIMOPTIC) 0 5 % ophthalmic solution INSTILL 1 DROP INTO BOTH EYES TWICE A DAY       No current facility-administered medications for this visit  Allergies   Allergen Reactions   • Ergocalciferol       Immunizations:     Immunization History   Administered Date(s) Administered   • COVID-19 PFIZER VACCINE 0 3 ML IM 02/03/2021, 02/24/2021, 10/22/2021   • INFLUENZA 11/11/2004, 11/15/2006, 10/29/2018, 10/05/2019, 09/21/2022   • Influenza Split High Dose Preservative Free IM 11/01/2016, 10/01/2017   • Influenza, high dose seasonal 0 7 mL 10/06/2020   • Influenza, seasonal, injectable 01/01/2016   • Pneumococcal Conjugate 13-Valent 09/15/2015   • Zoster 01/01/2013   • influenza, trivalent, adjuvanted 10/05/2019      Health Maintenance: There are no preventive care reminders to display for this patient        Topic Date Due   • Pneumococcal Vaccine: 65+ Years (2 - PPSV23 if available, else PCV20) 11/10/2015   • COVID-19 Vaccine (4 - Pfizer series) 12/17/2021      Medicare Screening Tests and Risk Assessments:     Katrin Marks is here for her Subsequent Wellness visit  Health Risk Assessment:   Patient rates overall health as good  Patient feels that their physical health rating is same  Patient is very satisfied with their life  Eyesight was rated as same  Hearing was rated as same  Patient feels that their emotional and mental health rating is same  Patients states they are never, rarely angry  Patient states they are often unusually tired/fatigued  Pain experienced in the last 7 days has been none  Patient states that she has experienced no weight loss or gain in last 6 months  Depression Screening:   PHQ-2 Score: 0      Fall Risk Screening: In the past year, patient has experienced: no history of falling in past year      Urinary Incontinence Screening:   Patient has not leaked urine accidently in the last six months  Home Safety:  Patient has trouble with stairs inside or outside of their home  Patient has working smoke alarms and has working carbon monoxide detector  Home safety hazards include: none  Nutrition:   Current diet is Regular  Medications:   Patient is currently taking over-the-counter supplements  OTC medications include: see medication list  Patient is able to manage medications  Activities of Daily Living (ADLs)/Instrumental Activities of Daily Living (IADLs):   Walk and transfer into and out of bed and chair?: Yes  Dress and groom yourself?: Yes    Bathe or shower yourself?: Yes    Feed yourself? Yes  Do your laundry/housekeeping?: Yes  Manage your money, pay your bills and track your expenses?: Yes  Make your own meals?: Yes    Do your own shopping?: Yes    Previous Hospitalizations:   Any hospitalizations or ED visits within the last 12 months?: No      Advance Care Planning:   Living will: Yes    Durable POA for healthcare:  Yes    Advanced directive: Yes      Cognitive Screening:   Provider or family/friend/caregiver concerned regarding cognition?: No    PREVENTIVE SCREENINGS      Cardiovascular Screening:    General: Screening Not Indicated and History Lipid Disorder      Diabetes Screening:     General: Screening Current      Colorectal Cancer Screening:     General: Screening Not Indicated      Breast Cancer Screening:     General: Screening Not Indicated      Cervical Cancer Screening:    General: Screening Not Indicated      Osteoporosis Screening:    General: Screening Not Indicated      Abdominal Aortic Aneurysm (AAA) Screening:        General: Screening Not Indicated      Lung Cancer Screening:     General: Screening Not Indicated      Hepatitis C Screening:    General: Screening Not Indicated    Screening, Brief Intervention, and Referral to Treatment (SBIRT)    Screening  Typical number of drinks in a day: 0  Typical number of drinks in a week: 0  Interpretation: Low risk drinking behavior  Single Item Drug Screening:  How often have you used an illegal drug (including marijuana) or a prescription medication for non-medical reasons in the past year? never    Single Item Drug Screen Score: 0  Interpretation: Negative screen for possible drug use disorder    Brief Intervention  Alcohol & drug use screenings were reviewed  No concerns regarding substance use disorder identified  No results found       Physical Exam:     /60 (BP Location: Left arm, Patient Position: Sitting, Cuff Size: Standard)   Pulse (!) 52   Temp 97 8 °F (36 6 °C)   Ht 5' (1 524 m)   Wt 46 3 kg (102 lb)   SpO2 98%   BMI 19 92 kg/m²     Physical Exam     Max Betty Reeves DO

## 2023-06-30 NOTE — ASSESSMENT & PLAN NOTE
Patient would benefit from new rollator walker  She has issues with balance and chronic orthopedic problems    Currently uses a 3 wheeled walker which is several years old and she had issues with a wheel several months ago

## 2023-07-03 DIAGNOSIS — E78.5 HYPERLIPIDEMIA, UNSPECIFIED HYPERLIPIDEMIA TYPE: ICD-10-CM

## 2023-07-03 RX ORDER — SIMVASTATIN 10 MG
10 TABLET ORAL DAILY
Qty: 90 TABLET | Refills: 0 | Status: SHIPPED | OUTPATIENT
Start: 2023-07-03

## 2023-07-30 DIAGNOSIS — I10 HYPERTENSION, UNSPECIFIED TYPE: ICD-10-CM

## 2023-07-30 DIAGNOSIS — E55.9 VITAMIN D DEFICIENCY: ICD-10-CM

## 2023-07-30 DIAGNOSIS — E78.5 HYPERLIPIDEMIA, UNSPECIFIED HYPERLIPIDEMIA TYPE: ICD-10-CM

## 2023-07-31 ENCOUNTER — TELEPHONE (OUTPATIENT)
Dept: INTERNAL MEDICINE CLINIC | Facility: CLINIC | Age: 88
End: 2023-07-31

## 2023-07-31 RX ORDER — AMLODIPINE BESYLATE 10 MG/1
TABLET ORAL
Qty: 135 TABLET | Refills: 1 | Status: SHIPPED | OUTPATIENT
Start: 2023-07-31

## 2023-07-31 RX ORDER — MULTIVIT-MIN/IRON/FOLIC ACID/K 18-600-40
CAPSULE ORAL
Qty: 180 TABLET | Refills: 1 | Status: SHIPPED | OUTPATIENT
Start: 2023-07-31

## 2023-07-31 NOTE — TELEPHONE ENCOUNTER
----- Message from [address] , DO sent at 7/24/2023  8:06 PM EDT -----  Labs stable. Thyroid numbers in the normal range, continue with current dose of levothyroxine. Creatinine is stable. Ariel Huynh likely has mild "chronic kidney disease", which is not of great concern and we can talk about in more detail at next appointment.  Recommend avoidance of NSAID medications such as ibuprofen or naproxen which can worsen kidney function with long-term use

## 2023-08-05 DIAGNOSIS — K21.9 GASTROESOPHAGEAL REFLUX DISEASE WITHOUT ESOPHAGITIS: ICD-10-CM

## 2023-08-07 RX ORDER — OMEPRAZOLE 20 MG/1
20 CAPSULE, DELAYED RELEASE ORAL DAILY PRN
Qty: 90 CAPSULE | Refills: 0 | Status: SHIPPED | OUTPATIENT
Start: 2023-08-07

## 2023-08-09 ENCOUNTER — TELEPHONE (OUTPATIENT)
Dept: INTERNAL MEDICINE CLINIC | Facility: CLINIC | Age: 88
End: 2023-08-09

## 2023-08-09 NOTE — TELEPHONE ENCOUNTER
----- Message from [address], DO sent at 7/24/2023  8:06 PM EDT -----  Labs stable. Thyroid numbers in the normal range, continue with current dose of levothyroxine. Creatinine is stable. Bryanna Washburn likely has mild "chronic kidney disease", which is not of great concern and we can talk about in more detail at next appointment.  Recommend avoidance of NSAID medications such as ibuprofen or naproxen which can worsen kidney function with long-term use

## 2023-09-29 DIAGNOSIS — E78.5 HYPERLIPIDEMIA, UNSPECIFIED HYPERLIPIDEMIA TYPE: ICD-10-CM

## 2023-09-29 RX ORDER — SIMVASTATIN 10 MG
10 TABLET ORAL DAILY
Qty: 90 TABLET | Refills: 0 | Status: SHIPPED | OUTPATIENT
Start: 2023-09-29

## 2023-10-30 ENCOUNTER — OFFICE VISIT (OUTPATIENT)
Dept: INTERNAL MEDICINE CLINIC | Facility: CLINIC | Age: 88
End: 2023-10-30
Payer: MEDICARE

## 2023-10-30 VITALS
HEIGHT: 60 IN | TEMPERATURE: 97.5 F | OXYGEN SATURATION: 95 % | BODY MASS INDEX: 18.65 KG/M2 | DIASTOLIC BLOOD PRESSURE: 70 MMHG | WEIGHT: 95 LBS | HEART RATE: 59 BPM | SYSTOLIC BLOOD PRESSURE: 146 MMHG

## 2023-10-30 DIAGNOSIS — E78.5 HYPERLIPIDEMIA, UNSPECIFIED HYPERLIPIDEMIA TYPE: ICD-10-CM

## 2023-10-30 DIAGNOSIS — N18.32 STAGE 3B CHRONIC KIDNEY DISEASE (HCC): ICD-10-CM

## 2023-10-30 DIAGNOSIS — E03.9 ACQUIRED HYPOTHYROIDISM: Primary | ICD-10-CM

## 2023-10-30 DIAGNOSIS — E55.9 VITAMIN D DEFICIENCY: ICD-10-CM

## 2023-10-30 DIAGNOSIS — I10 PRIMARY HYPERTENSION: ICD-10-CM

## 2023-10-30 PROCEDURE — 99214 OFFICE O/P EST MOD 30 MIN: CPT | Performed by: INTERNAL MEDICINE

## 2023-10-30 NOTE — ASSESSMENT & PLAN NOTE
Creatinine 1.36 October 2022, repeat creatinine 1.22 July 2023 -likely baseline for the patient.  CKD likely related to age, hypertension, history of MGUS also noted  -Continue to monitor

## 2023-10-30 NOTE — ASSESSMENT & PLAN NOTE
Control acceptable  Continue amlodipine 5 mg in the morning, 10 mg in the evening  Spironolactone 37.5 mg daily  Toprol- mg daily

## 2023-10-30 NOTE — PROGRESS NOTES
INTERNAL MEDICINE OFFICE VISIT  [facility]- [place]    NAME: Ricardo Amador  AGE: 80 y.o. SEX: female    DATE OF ENCOUNTER: 10/30/2023    Assessment and Plan/History of Present Illness     Here today for follow-up. She has a medical history of hypertension, hypothyroidism, GERD, hyperlipidemia, MGUS     Patient doing well at home. Here today with her daughter. Cramping has resolved with vitamin D per the patient. She is still able to cook her own meals, does grocery shopping at [facility].  She has had some gradual weight loss over the last year. Daughter will be looking into Mom's meals or meal service. Advised her to let us know if she would like some assistance from our office  to help with this      1. Acquired hypothyroidism  Assessment & Plan:  Most recent TSH level within normal limits. Continue current dose of levothyroxine      2. Primary hypertension  Assessment & Plan:  Control acceptable  Continue amlodipine 5 mg in the morning, 10 mg in the evening  Spironolactone 37.5 mg daily  Toprol- mg daily      3. Vitamin D deficiency  Assessment & Plan:  Continue supplementation      4. Hyperlipidemia, unspecified hyperlipidemia type  Assessment & Plan:  Continue simvastatin      5. Stage 3b chronic kidney disease [facility])  Assessment & Plan:  Creatinine 1.36 October 2022, repeat creatinine 1.22 July 2023 -likely baseline for the patient. CKD likely related to age, hypertension, history of MGUS also noted  -Continue to monitor                 No orders of the defined types were placed in this encounter.       Chief Complaint     Chief Complaint   Patient presents with   • Follow-up     4 month follow up        Review of Systems     10 point ROS negative except per HPI    The following portions of the patient's history were reviewed and updated as appropriate: allergies, current medications, past family history, past medical history, past social history, past surgical history and problem list.    Active Problem List     Patient Active Problem List   Diagnosis   • Primary hypertension   • Acquired hypothyroidism   • Stage 3b chronic kidney disease ([address])   • Hyperglycemia   • Hyperlipidemia   • MGUS (monoclonal gammopathy of unknown significance)   • Vitamin D deficiency   • Anxiety   • Gastroesophageal reflux disease without esophagitis   • Nocturnal leg cramps   • Ambulatory dysfunction       Objective     /70   Pulse 59   Temp 97.5 °F (36.4 °C)   Ht 5' (1.524 m)   Wt 43.1 kg (95 lb)   SpO2 95%   BMI 18.55 kg/m²     Physical Exam  Cardiovascular:      Rate and Rhythm: Normal rate and regular rhythm. Heart sounds: Normal heart sounds. No murmur heard. Pulmonary:      Effort: Pulmonary effort is normal.      Breath sounds: Normal breath sounds. No wheezing or rales. Pertinent Laboratory/Diagnostic Studies:  No results found.     Images and diagnostics reviewed     Current Medications     Current Outpatient Medications:   •  amLODIPine (NORVASC) 10 mg tablet, TAKE 1/2 TABLET IN THE MORNING, 1 TABLET IN THE EVENING, Disp: 135 tablet, Rfl: 1  •  D3-1000 25 MCG (1000 UT) tablet, TAKE 2 TABLETS (2,000 UNITS TOTAL) BY MOUTH DAILY, Disp: 180 tablet, Rfl: 1  •  levothyroxine 50 mcg tablet, Take 1.5 tablets (75 mcg total) by mouth daily, Disp: 135 tablet, Rfl: 1  •  Lumigan 0.01 % ophthalmic drops, INSTILL 1 DROP INTO BOTH EYES AT BEDTIME, Disp: , Rfl:   •  metoprolol succinate (TOPROL-XL) 200 MG 24 hr tablet, TAKE 1 TABLET BY MOUTH EVERY DAY, Disp: 90 tablet, Rfl: 0  •  omeprazole (PriLOSEC) 20 mg delayed release capsule, TAKE 1 CAPSULE (20 MG TOTAL) BY MOUTH DAILY AS NEEDED (FOR REFLUX), Disp: 90 capsule, Rfl: 0  •  Simbrinza 1-0.2 % SUSP, INSTILL 1 DROP IN BOTH EYES 3 TIMES DAILY, Disp: , Rfl:   •  simvastatin (ZOCOR) 10 mg tablet, TAKE 1 TABLET BY MOUTH EVERY DAY, Disp: 90 tablet, Rfl: 0  •  spironolactone (ALDACTONE) 25 mg tablet, Take 1.5 tablets (37.5 mg total) by mouth daily, Disp: 135 tablet, Rfl: 1  •  timolol (TIMOPTIC) 0.5 % ophthalmic solution, INSTILL 1 DROP INTO BOTH EYES TWICE A DAY, Disp: , Rfl:     Health Maintenance     Health Maintenance   Topic Date Due   • Pneumococcal Vaccine: 65+ Years (2 - PPSV23 if available, else PCV20) 11/10/2015   • COVID-19 Vaccine (4 - Pfizer series) 12/17/2021   • Fall Risk  06/30/2024   • Depression Screening  06/30/2024   • Urinary Incontinence Screening  06/30/2024   • Medicare Annual Wellness Visit (AWV)  06/30/2024   • BMI: Adult  10/30/2024   • Influenza Vaccine  Completed   • HIB Vaccine  Aged Out   • IPV Vaccine  Aged Out   • Hepatitis A Vaccine  Aged Out   • Meningococcal ACWY Vaccine  Aged Out   • HPV Vaccine  Aged Out     Immunization History   Administered Date(s) Administered   • COVID-19 PFIZER VACCINE 0.3 ML IM 02/03/2021, 02/24/2021, 10/22/2021   • INFLUENZA 11/11/2004, 11/15/2006, 10/29/2018, 10/05/2019, 09/21/2022   • Influenza Split High Dose Preservative Free IM 11/01/2016, 10/01/2017   • Influenza, high dose seasonal 0.7 mL 10/06/2020   • Influenza, seasonal, injectable 01/01/2016   • Pneumococcal Conjugate 13-Valent 09/15/2015   • Zoster 01/01/2013   • influenza, trivalent, adjuvanted 10/05/2019       Emigdio Hanson D.O.   [facility] Internal Medicine [place] [address], [address] [place] [address] [address] [place] [address]  Office: [phone]  Fax: [phone]

## 2023-11-02 DIAGNOSIS — K21.9 GASTROESOPHAGEAL REFLUX DISEASE WITHOUT ESOPHAGITIS: ICD-10-CM

## 2023-11-02 RX ORDER — OMEPRAZOLE 20 MG/1
20 CAPSULE, DELAYED RELEASE ORAL DAILY PRN
Qty: 90 CAPSULE | Refills: 0 | Status: SHIPPED | OUTPATIENT
Start: 2023-11-02

## 2023-11-06 DIAGNOSIS — I10 HYPERTENSION, UNSPECIFIED TYPE: ICD-10-CM

## 2023-11-06 RX ORDER — SPIRONOLACTONE 25 MG/1
37.5 TABLET ORAL DAILY
Qty: 135 TABLET | Refills: 1 | Status: SHIPPED | OUTPATIENT
Start: 2023-11-06

## 2023-11-22 NOTE — TELEPHONE ENCOUNTER
----- Message from Jacqui Saravia MD sent at 10/15/2019 10:18 AM EDT -----  TSH fine 
Patient aware of lab results 
no

## 2023-12-21 DIAGNOSIS — E78.5 HYPERLIPIDEMIA, UNSPECIFIED HYPERLIPIDEMIA TYPE: ICD-10-CM

## 2023-12-21 RX ORDER — SIMVASTATIN 10 MG
10 TABLET ORAL DAILY
Qty: 90 TABLET | Refills: 0 | Status: SHIPPED | OUTPATIENT
Start: 2023-12-21

## 2023-12-30 DIAGNOSIS — I10 HYPERTENSION, UNSPECIFIED TYPE: ICD-10-CM

## 2024-01-02 RX ORDER — METOPROLOL SUCCINATE 200 MG/1
TABLET, EXTENDED RELEASE ORAL
Qty: 90 TABLET | Refills: 0 | Status: SHIPPED | OUTPATIENT
Start: 2024-01-02

## 2024-01-22 DIAGNOSIS — E55.9 VITAMIN D DEFICIENCY: ICD-10-CM

## 2024-01-22 DIAGNOSIS — E78.5 HYPERLIPIDEMIA, UNSPECIFIED HYPERLIPIDEMIA TYPE: ICD-10-CM

## 2024-01-22 DIAGNOSIS — I10 HYPERTENSION, UNSPECIFIED TYPE: ICD-10-CM

## 2024-01-22 NOTE — TELEPHONE ENCOUNTER
Reason for call:   [x] Refill   [] Prior Auth  [] Other:     Office: [facility] [place]    [x] PCP/Provider - Emigdio Farley DO  [] Specialty/Provider -     Medication:     levothyroxine 50 mcg tablet  ig: Take 1.5 tablets (75 mcg total) by mouth daily  amLODIPine (NORVASC) 10 mg   Sig: TAKE 1/2 TABLET IN THE MORNING, 1 TABLET IN THE EVENING   D3-1000 25 MCG (1000 UT) tablet   Sig: TAKE 2 TABLETS (2,000 UNITS TOTAL) BY MOUTH DAILY    Ph[facility]    Does the patient have enough for 3 days?   [x] Yes   [] No - Send as HP to POD

## 2024-01-23 RX ORDER — LEVOTHYROXINE SODIUM 0.05 MG/1
75 TABLET ORAL DAILY
Qty: 135 TABLET | Refills: 1 | Status: SHIPPED | OUTPATIENT
Start: 2024-01-23

## 2024-01-23 RX ORDER — AMLODIPINE BESYLATE 10 MG/1
TABLET ORAL
Qty: 135 TABLET | Refills: 0 | Status: SHIPPED | OUTPATIENT
Start: 2024-01-23

## 2024-01-23 RX ORDER — MULTIVIT-MIN/IRON/FOLIC ACID/K 18-600-40
CAPSULE ORAL
Qty: 180 TABLET | Refills: 1 | Status: SHIPPED | OUTPATIENT
Start: 2024-01-23

## 2024-01-24 DIAGNOSIS — K21.9 GASTROESOPHAGEAL REFLUX DISEASE WITHOUT ESOPHAGITIS: ICD-10-CM

## 2024-01-24 RX ORDER — OMEPRAZOLE 20 MG/1
20 CAPSULE, DELAYED RELEASE ORAL DAILY PRN
Qty: 90 CAPSULE | Refills: 1 | Status: SHIPPED | OUTPATIENT
Start: 2024-01-24

## 2024-03-21 ENCOUNTER — RA CDI HCC (OUTPATIENT)
Dept: OTHER | Facility: HOSPITAL | Age: 89
End: 2024-03-21

## 2024-03-27 ENCOUNTER — OFFICE VISIT (OUTPATIENT)
Dept: INTERNAL MEDICINE CLINIC | Facility: CLINIC | Age: 89
End: 2024-03-27
Payer: MEDICARE

## 2024-03-27 VITALS
SYSTOLIC BLOOD PRESSURE: 110 MMHG | HEIGHT: 60 IN | DIASTOLIC BLOOD PRESSURE: 80 MMHG | WEIGHT: 95.2 LBS | HEART RATE: 68 BPM | BODY MASS INDEX: 18.69 KG/M2 | OXYGEN SATURATION: 95 % | TEMPERATURE: 97.7 F

## 2024-03-27 DIAGNOSIS — I10 PRIMARY HYPERTENSION: Primary | ICD-10-CM

## 2024-03-27 DIAGNOSIS — E83.52 HYPERCALCEMIA: ICD-10-CM

## 2024-03-27 DIAGNOSIS — N18.32 STAGE 3B CHRONIC KIDNEY DISEASE (HCC): ICD-10-CM

## 2024-03-27 DIAGNOSIS — E03.9 ACQUIRED HYPOTHYROIDISM: ICD-10-CM

## 2024-03-27 DIAGNOSIS — E55.9 VITAMIN D DEFICIENCY: ICD-10-CM

## 2024-03-27 DIAGNOSIS — E78.5 HYPERLIPIDEMIA, UNSPECIFIED HYPERLIPIDEMIA TYPE: ICD-10-CM

## 2024-03-27 DIAGNOSIS — D47.2 MGUS (MONOCLONAL GAMMOPATHY OF UNKNOWN SIGNIFICANCE): ICD-10-CM

## 2024-03-27 PROCEDURE — G2211 COMPLEX E/M VISIT ADD ON: HCPCS | Performed by: INTERNAL MEDICINE

## 2024-03-27 PROCEDURE — 99214 OFFICE O/P EST MOD 30 MIN: CPT | Performed by: INTERNAL MEDICINE

## 2024-03-27 NOTE — PROGRESS NOTES
INTERNAL MEDICINE OFFICE VISIT  [facility] Internal [facility]- [place]    NAME: Sabine Amador  AGE: 89 y.o. SEX: female    DATE OF ENCOUNTER: 3/27/2024    Assessment and Plan/History of Present Illness     Here today for follow-up  She has a medical history of hypertension, hypothyroidism, GERD, hyperlipidemia, MGUS      1. Primary hypertension  Assessment & Plan:  Control acceptable  Continue amlodipine 5 mg in the morning, 10 mg in the evening  Spironolactone 37.5 mg daily  Toprol- mg daily      2. Stage 3b chronic kidney disease (HCC)  Assessment & Plan:  Creatinine 1.36 October 2022, repeat creatinine 1.22 July 2023 -likely baseline for the patient. CKD likely related to age, hypertension, history of MGUS also noted  -Continue to monitor         3. Hyperlipidemia, unspecified hyperlipidemia type  Assessment & Plan:  Continue simvastatin    Orders:  -     Lipid Panel with Direct LDL reflex; Future  -     Lipid Panel with Direct LDL reflex    4. Acquired hypothyroidism  Assessment & Plan:  Most recent TSH level within normal limits.  Continue current dose of levothyroxine    Orders:  -     TSH, 3rd generation with Free T4 reflex; Future    5. Vitamin D deficiency  -     Vitamin D 25 hydroxy; Future  -     Vitamin D 25 hydroxy    6. Hypercalcemia  -     PTH, intact; Future  -     PTH, intact    7. MGUS (monoclonal gammopathy of unknown significance)  Assessment & Plan:  History of MGUS noted.  Mild hypercalcemia and elevation in creatinine noted on recent labs  -Check SPEP/UPEP, FLC, CBC/CMP with next of labs  -Check PTH and vitamin D    Orders:  -     CBC and differential; Future  -     Comprehensive metabolic panel; Future  -     Protein electrophoresis, serum; Future  -     Immunoglobulin free LT chains blood; Future  -     Protein electrophoresis, urine; Future  -     Protein electrophoresis, serum  -     Protein electrophoresis, urine               Orders Placed This Encounter   Procedures   • CBC and  differential   • Comprehensive metabolic panel   • TSH, 3rd generation with Free T4 reflex   • Protein electrophoresis, serum   • Immunoglobulin free LT chains blood   • Protein electrophoresis, urine   • PTH, intact   • Vitamin D 25 hydroxy   • Lipid Panel with Direct LDL reflex       Chief Complaint     Chief Complaint   Patient presents with   • Follow-up         Review of Systems     10 point ROS negative except per HPI    The following portions of the patient's history were reviewed and updated as appropriate: allergies, current medications, past family history, past medical history, past social history, past surgical history and problem list.    Active Problem List     Patient Active Problem List   Diagnosis   • Primary hypertension   • Acquired hypothyroidism   • Stage 3b chronic kidney disease (HCC)   • Hyperglycemia   • Hyperlipidemia   • MGUS (monoclonal gammopathy of unknown significance)   • Vitamin D deficiency   • Anxiety   • Gastroesophageal reflux disease without esophagitis   • Nocturnal leg cramps   • Ambulatory dysfunction       Objective     /80 (BP Location: Left arm, Patient Position: Sitting)   Pulse 68   Temp 97.7 °F (36.5 °C) (Tympanic)   Ht 5' (1.524 m)   Wt 43.2 kg (95 lb 3.2 oz)   SpO2 95%   BMI 18.59 kg/m²     Physical Exam  Cardiovascular:      Rate and Rhythm: Normal rate and regular rhythm.      Heart sounds: Normal heart sounds. No murmur heard.  Pulmonary:      Effort: Pulmonary effort is normal.      Breath sounds: Normal breath sounds. No wheezing or rales.         Pertinent Laboratory/Diagnostic Studies:  No results found.    Images and diagnostics reviewed     Current Medications     Current Outpatient Medications:   •  amLODIPine (NORVASC) 10 mg tablet, TAKE 1/2 TABLET IN THE MORNING, 1 TABLET IN THE EVENING, Disp: 135 tablet, Rfl: 0  •  Cholecalciferol (D3-1000) 25 MCG (1000 UT) tablet, TAKE 2 TABLETS (2,000 UNITS TOTAL) BY MOUTH DAILY Strength: 25 MCG (1000 UT),  Disp: 180 tablet, Rfl: 1  •  levothyroxine 50 mcg tablet, Take 1.5 tablets (75 mcg total) by mouth daily, Disp: 135 tablet, Rfl: 1  •  metoprolol succinate (TOPROL-XL) 200 MG 24 hr tablet, TAKE 1 TABLET BY MOUTH EVERY DAY, Disp: 90 tablet, Rfl: 0  •  omeprazole (PriLOSEC) 20 mg delayed release capsule, TAKE 1 CAPSULE (20 MG TOTAL) BY MOUTH DAILY AS NEEDED (FOR REFLUX), Disp: 90 capsule, Rfl: 1  •  simvastatin (ZOCOR) 10 mg tablet, TAKE 1 TABLET BY MOUTH EVERY DAY, Disp: 90 tablet, Rfl: 0  •  spironolactone (ALDACTONE) 25 mg tablet, TAKE 1.5 TABLETS BY MOUTH DAILY., Disp: 135 tablet, Rfl: 1    Health Maintenance     Health Maintenance   Topic Date Due   • Zoster Vaccine (2 of 3) 02/26/2013   • Pneumococcal Vaccine: 65+ Years (2 of 2 - PPSV23 or PCV20) 11/10/2015   • COVID-19 Vaccine (5 - 2023-24 season) 12/01/2023   • Fall Risk  06/30/2024   • Urinary Incontinence Screening  06/30/2024   • Medicare Annual Wellness Visit (AWV)  06/30/2024   • Depression Screening  03/27/2025   • Influenza Vaccine  Completed   • HIB Vaccine  Aged Out   • IPV Vaccine  Aged Out   • Hepatitis A Vaccine  Aged Out   • Meningococcal ACWY Vaccine  Aged Out   • HPV Vaccine  Aged Out     Immunization History   Administered Date(s) Administered   • COVID-19 PFIZER VACCINE 0.3 ML IM 02/03/2021, 02/24/2021, 10/22/2021   • COVID-19 Pfizer mRNA vacc PF gutierrez-sucrose 12 yr and older (Comirnaty) 10/06/2023   • INFLUENZA 11/11/2004, 11/15/2006, 10/29/2018, 10/05/2019, 10/15/2021, 09/21/2022, 09/22/2023   • Influenza Split High Dose Preservative Free IM 11/01/2016, 10/01/2017   • Influenza, high dose seasonal 0.7 mL 10/06/2020   • Influenza, seasonal, injectable 01/01/2016   • Pneumococcal Conjugate 13-Valent 09/15/2015   • Zoster 01/01/2013   • influenza, trivalent, adjuvanted 10/05/2019       Emigdio Farley D.O.  [facility] Internal Medicine [address]  [place] [address]  Office: [phone]  Fax:  [phone]

## 2024-03-27 NOTE — ASSESSMENT & PLAN NOTE
History of MGUS noted.  Mild hypercalcemia and elevation in creatinine noted on recent labs  -Check SPEP/UPEP, FLC, CBC/CMP with next of labs  -Check PTH and vitamin D

## 2024-03-28 LAB
25(OH)D3+25(OH)D2 SERPL-MCNC: 30 NG/ML (ref 30–100)
ALBUMIN SERPL ELPH-MCNC: NORMAL G/DL (ref 4.1–5.1)
ALBUMIN SERPL-MCNC: 4.8 G/DL (ref 3.5–5.7)
ALBUMIN/GLOB SERPL: NORMAL {RATIO}
ALP SERPL-CCNC: 65 U/L (ref 35–120)
ALPHA1 GLOB SERPL ELPH-MCNC: NORMAL G/DL (ref 0.1–0.2)
ALPHA2 GLOB SERPL ELPH-MCNC: NORMAL G/DL (ref 0.6–0.9)
ALT SERPL-CCNC: 10 U/L
ANION GAP SERPL CALCULATED.3IONS-SCNC: 10 MMOL/L (ref 3–11)
AST SERPL-CCNC: 17 U/L
B-GLOBULIN SERPL ELPH-MCNC: NORMAL G/DL (ref 0.6–1)
BASOPHILS # BLD AUTO: 0.1 THOU/CMM (ref 0–0.1)
BASOPHILS NFR BLD AUTO: 1 %
BILIRUB SERPL-MCNC: 1 MG/DL (ref 0.2–1)
BUN SERPL-MCNC: 20 MG/DL (ref 7–25)
CALCIUM SERPL-MCNC: 10.8 MG/DL (ref 8.5–10.1)
CHLORIDE SERPL-SCNC: 103 MMOL/L (ref 100–109)
CHOLEST SERPL-MCNC: 183 MG/DL
CHOLEST/HDLC SERPL: 2.2 {RATIO}
CO2 SERPL-SCNC: 26 MMOL/L (ref 21–31)
CREAT SERPL-MCNC: 1.12 MG/DL (ref 0.4–1.1)
CYTOLOGY CMNT CVX/VAG CYTO-IMP: ABNORMAL
DIFFERENTIAL METHOD BLD: NORMAL
EOSINOPHIL # BLD AUTO: 0.2 THOU/CMM (ref 0–0.5)
EOSINOPHIL NFR BLD AUTO: 3 %
ERYTHROCYTE [DISTWIDTH] IN BLOOD BY AUTOMATED COUNT: 14.3 % (ref 12–16)
GAMMA GLOB SERPL ELPH-MCNC: NORMAL G/DL (ref 0.5–1.2)
GFR/BSA.PRED SERPLBLD CYS-BASED-ARV: 45 ML/MIN/{1.73_M2}
GLUCOSE SERPL-MCNC: 107 MG/DL (ref 65–99)
HCT VFR BLD AUTO: 41.5 % (ref 35–43)
HDLC SERPL-MCNC: 83 MG/DL (ref 23–92)
HGB BLD-MCNC: 13.4 G/DL (ref 11.5–14.5)
LDLC SERPL CALC-MCNC: 78 MG/DL
LYMPHOCYTES # BLD AUTO: 2.5 THOU/CMM (ref 1–3)
LYMPHOCYTES NFR BLD AUTO: 42 %
MCH RBC QN AUTO: 28.6 PG (ref 26–34)
MCHC RBC AUTO-ENTMCNC: 32.4 G/DL (ref 32–37)
MCV RBC AUTO: 88 FL (ref 80–100)
MONOCYTES # BLD AUTO: 0.6 THOU/CMM (ref 0.3–1)
MONOCYTES NFR BLD AUTO: 10 %
NEUTROPHILS # BLD AUTO: 2.6 THOU/CMM (ref 1.8–7.8)
NEUTROPHILS NFR BLD AUTO: 44 %
NONHDLC SERPL-MCNC: 100 MG/DL
PLATELET # BLD AUTO: 294 THOU/CMM (ref 140–350)
PMV BLD REES-ECKER: 9 FL (ref 7.5–11.3)
POTASSIUM SERPL-SCNC: 4.6 MMOL/L (ref 3.5–5.2)
PROT PATTERN SERPL ELPH-IMP: NORMAL
PROT SERPL-MCNC: 7.6 G/DL (ref 6.3–8.3)
PROT SERPL-MCNC: 7.6 G/DL (ref 6.3–8.3)
PTH-INTACT SERPL-MCNC: 77.1 PG/ML (ref 12–88)
RBC # BLD AUTO: 4.7 MILL/CMM (ref 3.7–4.7)
SL AMB PROTEIN ELECTROPHORESIS, S: NORMAL
SODIUM SERPL-SCNC: 139 MMOL/L (ref 135–145)
T4 FREE SERPL-MCNC: 1.12 NG/DL (ref 0.61–1.12)
TRIGL SERPL-MCNC: 108 MG/DL
TSH SERPL-ACNC: 0.05 UIU/ML (ref 0.45–5.33)
WBC # BLD AUTO: 5.9 THOU/CMM (ref 4–10)

## 2024-03-29 DIAGNOSIS — I10 HYPERTENSION, UNSPECIFIED TYPE: ICD-10-CM

## 2024-03-29 RX ORDER — METOPROLOL SUCCINATE 200 MG/1
TABLET, EXTENDED RELEASE ORAL
Qty: 90 TABLET | Refills: 1 | Status: SHIPPED | OUTPATIENT
Start: 2024-03-29

## 2024-04-01 LAB
ALBUMIN SERPL ELPH-MCNC: 4.9 G/DL (ref 4.1–5.1)
ALBUMIN/GLOB SERPL: 1.8 {RATIO}
ALPHA1 GLOB SERPL ELPH-MCNC: 0.2 G/DL (ref 0.1–0.2)
ALPHA2 GLOB SERPL ELPH-MCNC: 0.9 G/DL (ref 0.6–0.9)
B-GLOBULIN SERPL ELPH-MCNC: 0.7 G/DL (ref 0.6–1)
GAMMA GLOB SERPL ELPH-MCNC: 0.9 G/DL (ref 0.5–1.2)
IGA SERPL-MCNC: 120 MG/DL (ref 83–407)
IGG SERPL-MCNC: 964 MG/DL (ref 680–1445)
IGM SERPL-MCNC: 280 MG/DL (ref 34–214)
INTERPRETATION SERPL IFE-IMP: ABNORMAL
PROT PATTERN SERPL ELPH-IMP: NORMAL
PROT PATTERN UR ELPH-IMP: NORMAL
PROT SERPL-MCNC: 7.6 G/DL (ref 6.3–8.3)
SL AMB PROTEIN ELECTROPHORESIS, S: NORMAL

## 2024-04-05 DIAGNOSIS — I10 HYPERTENSION, UNSPECIFIED TYPE: ICD-10-CM

## 2024-04-05 DIAGNOSIS — E03.9 ACQUIRED HYPOTHYROIDISM: Primary | ICD-10-CM

## 2024-04-05 RX ORDER — LEVOTHYROXINE SODIUM 0.05 MG/1
50 TABLET ORAL DAILY
Start: 2024-04-05

## 2024-04-11 ENCOUNTER — TELEPHONE (OUTPATIENT)
Dept: INTERNAL MEDICINE CLINIC | Facility: CLINIC | Age: 89
End: 2024-04-11

## 2024-04-11 NOTE — TELEPHONE ENCOUNTER
----- Message from Emigdio Farley DO sent at 4/5/2024  8:32 PM EDT -----  Please give patient a call.  Labs overall stable, no significant concerns.  TSH levels are low which indicates we should reduce dose of levothyroxine.  Recommend reducing to 50 mcg daily.  Recheck thyroid numbers in 6 weeks after the dose adjustment    Stable, mild elevations in creatinine, calcium.  We will continue to monitor this on future blood work.  Would recommend discontinuation of any calcium supplementation if Sabine is taking any

## 2024-04-15 ENCOUNTER — TELEPHONE (OUTPATIENT)
Age: 89
End: 2024-04-15

## 2024-04-15 DIAGNOSIS — E03.9 ACQUIRED HYPOTHYROIDISM: ICD-10-CM

## 2024-04-15 RX ORDER — LEVOTHYROXINE SODIUM 0.05 MG/1
50 TABLET ORAL DAILY
Qty: 90 TABLET | Refills: 0 | Status: SHIPPED | OUTPATIENT
Start: 2024-04-15

## 2024-04-15 NOTE — TELEPHONE ENCOUNTER
Patient asking if her levothyroxine new med dose can be sent to the [facility] pharmacy for her.

## 2024-04-21 DIAGNOSIS — I10 HYPERTENSION, UNSPECIFIED TYPE: ICD-10-CM

## 2024-04-21 RX ORDER — AMLODIPINE BESYLATE 10 MG/1
TABLET ORAL
Qty: 135 TABLET | Refills: 0 | Status: SHIPPED | OUTPATIENT
Start: 2024-04-21 | End: 2024-04-22 | Stop reason: SDUPTHER

## 2024-04-22 DIAGNOSIS — I10 HYPERTENSION, UNSPECIFIED TYPE: ICD-10-CM

## 2024-04-22 RX ORDER — AMLODIPINE BESYLATE 10 MG/1
TABLET ORAL
Qty: 135 TABLET | Refills: 1 | Status: SHIPPED | OUTPATIENT
Start: 2024-04-22

## 2024-05-01 DIAGNOSIS — I10 HYPERTENSION, UNSPECIFIED TYPE: ICD-10-CM

## 2024-05-02 RX ORDER — SPIRONOLACTONE 25 MG/1
TABLET ORAL
Qty: 135 TABLET | Refills: 1 | Status: SHIPPED | OUTPATIENT
Start: 2024-05-02 | End: 2024-05-08 | Stop reason: SDUPTHER

## 2024-05-08 DIAGNOSIS — E78.5 HYPERLIPIDEMIA, UNSPECIFIED HYPERLIPIDEMIA TYPE: ICD-10-CM

## 2024-05-08 DIAGNOSIS — I10 HYPERTENSION, UNSPECIFIED TYPE: ICD-10-CM

## 2024-05-08 RX ORDER — SIMVASTATIN 10 MG
10 TABLET ORAL DAILY
Qty: 90 TABLET | Refills: 1 | Status: SHIPPED | OUTPATIENT
Start: 2024-05-08

## 2024-05-08 RX ORDER — SPIRONOLACTONE 25 MG/1
25 TABLET ORAL DAILY
Qty: 135 TABLET | Refills: 1 | Status: SHIPPED | OUTPATIENT
Start: 2024-05-08

## 2024-07-10 DIAGNOSIS — E03.9 ACQUIRED HYPOTHYROIDISM: ICD-10-CM

## 2024-07-10 RX ORDER — LEVOTHYROXINE SODIUM 0.05 MG/1
50 TABLET ORAL DAILY
Qty: 100 TABLET | Refills: 0 | Status: SHIPPED | OUTPATIENT
Start: 2024-07-10

## 2024-07-15 DIAGNOSIS — I10 HYPERTENSION, UNSPECIFIED TYPE: ICD-10-CM

## 2024-07-15 DIAGNOSIS — E78.5 HYPERLIPIDEMIA, UNSPECIFIED HYPERLIPIDEMIA TYPE: ICD-10-CM

## 2024-07-15 DIAGNOSIS — E55.9 VITAMIN D DEFICIENCY: ICD-10-CM

## 2024-07-18 DIAGNOSIS — K21.9 GASTROESOPHAGEAL REFLUX DISEASE WITHOUT ESOPHAGITIS: ICD-10-CM

## 2024-07-18 RX ORDER — OMEPRAZOLE 20 MG/1
20 CAPSULE, DELAYED RELEASE ORAL DAILY PRN
Qty: 100 CAPSULE | Refills: 1 | Status: SHIPPED | OUTPATIENT
Start: 2024-07-18

## 2024-08-05 ENCOUNTER — RA CDI HCC (OUTPATIENT)
Dept: OTHER | Facility: HOSPITAL | Age: 89
End: 2024-08-05

## 2024-09-25 DIAGNOSIS — I10 HYPERTENSION, UNSPECIFIED TYPE: ICD-10-CM

## 2024-09-25 RX ORDER — METOPROLOL SUCCINATE 200 MG/1
TABLET, EXTENDED RELEASE ORAL
Qty: 90 TABLET | Refills: 1 | Status: SHIPPED | OUTPATIENT
Start: 2024-09-25

## 2024-10-03 DIAGNOSIS — E03.9 ACQUIRED HYPOTHYROIDISM: ICD-10-CM

## 2024-10-03 RX ORDER — LEVOTHYROXINE SODIUM 50 UG/1
50 TABLET ORAL DAILY
Qty: 90 TABLET | Refills: 1 | Status: SHIPPED | OUTPATIENT
Start: 2024-10-03

## 2024-10-04 DIAGNOSIS — I10 HYPERTENSION, UNSPECIFIED TYPE: ICD-10-CM

## 2024-10-04 RX ORDER — AMLODIPINE BESYLATE 10 MG/1
TABLET ORAL
Qty: 135 TABLET | Refills: 1 | Status: SHIPPED | OUTPATIENT
Start: 2024-10-04

## 2024-10-04 NOTE — TELEPHONE ENCOUNTER
Pt called in for refill on amlodipine to be sent to [facility]. Pt would like call back when sent so she'll know when to . Thank you.

## 2024-10-07 ENCOUNTER — RA CDI HCC (OUTPATIENT)
Dept: OTHER | Facility: HOSPITAL | Age: 89
End: 2024-10-07

## 2024-10-11 ENCOUNTER — OFFICE VISIT (OUTPATIENT)
Dept: INTERNAL MEDICINE CLINIC | Facility: CLINIC | Age: 89
End: 2024-10-11
Payer: MEDICARE

## 2024-10-11 VITALS
DIASTOLIC BLOOD PRESSURE: 66 MMHG | WEIGHT: 94.4 LBS | TEMPERATURE: 97.9 F | BODY MASS INDEX: 18.53 KG/M2 | RESPIRATION RATE: 14 BRPM | HEIGHT: 60 IN | HEART RATE: 80 BPM | SYSTOLIC BLOOD PRESSURE: 130 MMHG

## 2024-10-11 DIAGNOSIS — I10 PRIMARY HYPERTENSION: ICD-10-CM

## 2024-10-11 DIAGNOSIS — F17.210 CIGARETTE SMOKER: ICD-10-CM

## 2024-10-11 DIAGNOSIS — N18.32 STAGE 3B CHRONIC KIDNEY DISEASE (HCC): ICD-10-CM

## 2024-10-11 DIAGNOSIS — E83.52 HYPERCALCEMIA: ICD-10-CM

## 2024-10-11 DIAGNOSIS — E55.9 VITAMIN D DEFICIENCY: ICD-10-CM

## 2024-10-11 DIAGNOSIS — D47.2 MGUS (MONOCLONAL GAMMOPATHY OF UNKNOWN SIGNIFICANCE): ICD-10-CM

## 2024-10-11 DIAGNOSIS — K21.9 GASTROESOPHAGEAL REFLUX DISEASE WITHOUT ESOPHAGITIS: ICD-10-CM

## 2024-10-11 DIAGNOSIS — E78.5 HYPERLIPIDEMIA, UNSPECIFIED HYPERLIPIDEMIA TYPE: ICD-10-CM

## 2024-10-11 DIAGNOSIS — Z00.00 MEDICARE ANNUAL WELLNESS VISIT, SUBSEQUENT: Primary | ICD-10-CM

## 2024-10-11 DIAGNOSIS — E03.9 ACQUIRED HYPOTHYROIDISM: ICD-10-CM

## 2024-10-11 PROCEDURE — 99214 OFFICE O/P EST MOD 30 MIN: CPT | Performed by: INTERNAL MEDICINE

## 2024-10-11 PROCEDURE — G0439 PPPS, SUBSEQ VISIT: HCPCS | Performed by: INTERNAL MEDICINE

## 2024-10-11 NOTE — ASSESSMENT & PLAN NOTE
Stable, mild hypercalcemia.  Calcium level 10.8 on most recent set of labs March 2024.  PTH and vitamin D within normal limits.  Consider primary hyperparathyroidism, patient does have underlying MGUS  -Continue to monitor    Orders:    PTH, intact; Future    PTH, intact

## 2024-10-11 NOTE — ASSESSMENT & PLAN NOTE
Lab Results   Component Value Date    EGFR 45 (L) 03/28/2024    EGFR 45 (L) 03/28/2024    EGFR 41 (L) 07/24/2023    CREATININE 1.12 (H) 03/28/2024    CREATININE 1.12 (H) 03/28/2024    CREATININE 1.22 (H) 07/24/2023     Stable.  Continue to monitor

## 2024-10-11 NOTE — PROGRESS NOTES
Ambulatory Visit  Name: Sabine Amador      : 1927      MRN: [id number]  Encounter Provider: Emigdio Farley DO  Encounter Date: 10/11/2024   [facility]    She has a medical history of hypertension, hypothyroidism, GERD, hyperlipidemia, MGUS      Assessment & Plan  Medicare annual wellness visit, subsequent         Acquired hypothyroidism  Most recent TSH suppressed at 0.2024.  Has not yet gone for repeat thyroid studies as previously requested.  We will recheck labs prior to follow-up    Continue current dose of levothyroxine for now  Orders:    TSH, 3rd generation with Free T4 reflex; Future    TSH, 3rd generation with Free T4 reflex    Stage 3b chronic kidney disease (HCC)  Lab Results   Component Value Date    EGFR 45 (L) 2024    EGFR 45 (L) 2024    EGFR 41 (L) 2023    CREATININE 1.12 (H) 2024    CREATININE 1.12 (H) 2024    CREATININE 1.22 (H) 2023     Stable.  Continue to monitor       Primary hypertension  Control acceptable  Continue amlodipine 5 mg in the morning, 10 mg in the evening  Spironolactone 37.5 mg daily  Toprol- mg daily         Gastroesophageal reflux disease without esophagitis  Stable.  Continue Prilosec as needed         Hypercalcemia  Stable, mild hypercalcemia.  Calcium level 10.8 on most recent set of labs 2024.  PTH and vitamin D within normal limits.  Consider primary hyperparathyroidism, patient does have underlying MGUS  -Continue to monitor    Orders:    PTH, intact; Future    PTH, intact    Vitamin D deficiency    Orders:    Vitamin D 25 hydroxy; Future    Vitamin D 25 hydroxy    MGUS (monoclonal gammopathy of unknown significance)  SPEP/UPEP completed 2024.  Slight elevation in IgM level, monoclonal gammopathy with IgM kappa present  -Renal function stable.  Stable, mild hypercalcemia  -Continue to monitor.  Recheck labs prior to follow-up  Orders:    Protein  electrophoresis, serum; Future    CBC and differential; Future    Immunoglobulin free LT chains blood; Future    Comprehensive metabolic panel; Future    Protein electrophoresis, serum    CBC and differential    Comprehensive metabolic panel    Hyperlipidemia, unspecified hyperlipidemia type  Continue statin  Orders:    Lipid Panel with Direct LDL reflex; Future    Lipid Panel with Direct LDL reflex    Cigarette smoker  Continues to smoke approximately 5 cigarettes a day            Preventive health issues were discussed with patient, and age appropriate screening tests were ordered as noted in patient's After Visit Summary. Personalized health advice and appropriate referrals for health education or preventive services given if needed, as noted in patient's After Visit Summary.    History of Present Illness     HPI   Patient Care Team:  Emigdio Farley DO as PCP - General (Internal Medicine)    Review of Systems  Medical History Reviewed by provider this encounter:       Annual Wellness Visit Questionnaire   Sabine is here for her Subsequent Wellness visit.     Health Risk Assessment:   Patient rates overall health as good. Patient feels that their physical health rating is same. Patient is satisfied with their life. Eyesight was rated as same. Hearing was rated as same. Patient feels that their emotional and mental health rating is much better. Patients states they are sometimes angry. Patient states they are never, rarely unusually tired/fatigued. Pain experienced in the last 7 days has been none. Patient states that she has experienced no weight loss or gain in last 6 months.     Depression Screening:   PHQ-2 Score: 0      Fall Risk Screening:   In the past year, patient has experienced: no history of falling in past year      Urinary Incontinence Screening:   Patient has leaked urine accidently in the last six months.     Home Safety:  Patient has trouble with stairs inside or outside of their home. Patient  has working smoke alarms and has working carbon monoxide detector. Home safety hazards include: none.     Nutrition:   Current diet is Regular and Limited junk food.     Medications:   Patient is currently taking over-the-counter supplements. OTC medications include: see medication list. Patient is able to manage medications.     Activities of Daily Living (ADLs)/Instrumental Activities of Daily Living (IADLs):   Walk and transfer into and out of bed and chair?: Yes  Dress and groom yourself?: Yes    Bathe or shower yourself?: Yes    Feed yourself? Yes  Do your laundry/housekeeping?: No  Manage your money, pay your bills and track your expenses?: Yes  Make your own meals?: Yes    Do your own shopping?: No    Previous Hospitalizations:   Any hospitalizations or ED visits within the last 12 months?: No      Advance Care Planning:   Living will: No    Durable POA for healthcare: No    Advanced directive: No      Cognitive Screening:   Provider or family/friend/caregiver concerned regarding cognition?: No    PREVENTIVE SCREENINGS      Cardiovascular Screening:    General: Screening Not Indicated and History Lipid Disorder      Diabetes Screening:     General: Screening Current      Colorectal Cancer Screening:     General: Screening Not Indicated      Breast Cancer Screening:     General: Screening Not Indicated      Cervical Cancer Screening:    General: Screening Not Indicated      Osteoporosis Screening:    General: Screening Not Indicated      Abdominal Aortic Aneurysm (AAA) Screening:        General: Screening Not Indicated      Lung Cancer Screening:     General: Screening Not Indicated      Hepatitis C Screening:    General: Screening Not Indicated    Screening, Brief Intervention, and Referral to Treatment (SBIRT)    Screening  Typical number of drinks in a day: 0  Typical number of drinks in a week: 0  Interpretation: Low risk drinking behavior.    Single Item Drug Screening:  How often have you used an illegal  drug (including marijuana) or a prescription medication for non-medical reasons in the past year? never    Single Item Drug Screen Score: 0  Interpretation: Negative screen for possible drug use disorder    Brief Intervention  Alcohol & drug use screenings were reviewed. No concerns regarding substance use disorder identified.     Social Determinants of Health     Financial Resource Strain: Low Risk  (6/30/2023)    Overall Financial Resource Strain (CARDIA)     Difficulty of Paying Living Expenses: Not very hard   Food Insecurity: No Food Insecurity (10/11/2024)    Hunger Vital Sign     Worried About Running Out of Food in the Last Year: Never true     Ran Out of Food in the Last Year: Never true   Transportation Needs: No Transportation Needs (10/11/2024)    PRAPARE - Transportation     Lack of Transportation (Medical): No     Lack of Transportation (Non-Medical): No   Housing Stability: Low Risk  (10/11/2024)    Housing Stability Vital Sign     Unable to Pay for Housing in the Last Year: No     Number of Times Moved in the Last Year: 0     Homeless in the Last Year: No   Utilities: Not At Risk (10/11/2024)    [facility] Utilities     Threatened with loss of utilities: No     No results found.    Objective     /66 (BP Location: Left arm, Patient Position: Sitting, Cuff Size: Standard)   Pulse 80   Temp 97.9 °F (36.6 °C)   Resp 14   Ht 5' (1.524 m)   Wt 42.8 kg (94 lb 6.4 oz)   BMI 18.44 kg/m²     Physical Exam  Cardiovascular:      Rate and Rhythm: Normal rate and regular rhythm.      Heart sounds: No murmur heard.  Pulmonary:      Effort: Pulmonary effort is normal.      Breath sounds: Normal breath sounds. No wheezing or rales.

## 2024-10-11 NOTE — ASSESSMENT & PLAN NOTE
Continue statin  Orders:    Lipid Panel with Direct LDL reflex; Future    Lipid Panel with Direct LDL reflex

## 2024-10-11 NOTE — ASSESSMENT & PLAN NOTE
Most recent TSH suppressed at 0.05 March 2024.  Has not yet gone for repeat thyroid studies as previously requested.  We will recheck labs prior to follow-up    Continue current dose of levothyroxine for now  Orders:    TSH, 3rd generation with Free T4 reflex; Future    TSH, 3rd generation with Free T4 reflex

## 2024-10-11 NOTE — ASSESSMENT & PLAN NOTE
SPEP/UPEP completed March 2024.  Slight elevation in IgM level, monoclonal gammopathy with IgM kappa present  -Renal function stable.  Stable, mild hypercalcemia  -Continue to monitor.  Recheck labs prior to follow-up  Orders:    Protein electrophoresis, serum; Future    CBC and differential; Future    Immunoglobulin free LT chains blood; Future    Comprehensive metabolic panel; Future    Protein electrophoresis, serum    CBC and differential    Comprehensive metabolic panel

## 2024-10-31 DIAGNOSIS — E78.5 HYPERLIPIDEMIA, UNSPECIFIED HYPERLIPIDEMIA TYPE: ICD-10-CM

## 2024-10-31 RX ORDER — SIMVASTATIN 10 MG
10 TABLET ORAL DAILY
Qty: 90 TABLET | Refills: 1 | Status: SHIPPED | OUTPATIENT
Start: 2024-10-31

## 2024-11-06 LAB
25(OH)D3 SERPL-MCNC: 41 NG/ML (ref 30–100)
ALBUMIN SERPL ELPH-MCNC: 4.1 G/DL (ref 3.8–4.8)
ALBUMIN SERPL-MCNC: 4.2 G/DL (ref 3.6–5.1)
ALBUMIN/GLOB SERPL: 1.4 (CALC) (ref 1–2.5)
ALP SERPL-CCNC: 75 U/L (ref 37–153)
ALPHA1 GLOB SERPL ELPH-MCNC: 0.3 G/DL (ref 0.2–0.3)
ALPHA2 GLOB SERPL ELPH-MCNC: 1 G/DL (ref 0.5–0.9)
ALT SERPL-CCNC: 10 U/L (ref 6–29)
AST SERPL-CCNC: 19 U/L (ref 10–35)
BASOPHILS # BLD AUTO: 71 CELLS/UL (ref 0–200)
BASOPHILS NFR BLD AUTO: 1.2 %
BETA1 GLOB SERPL ELPH-MCNC: 0.5 G/DL (ref 0.4–0.6)
BETA2 GLOB SERPL ELPH-MCNC: 0.4 G/DL (ref 0.2–0.5)
BILIRUB SERPL-MCNC: 0.6 MG/DL (ref 0.2–1.2)
BUN SERPL-MCNC: 17 MG/DL (ref 7–25)
BUN/CREAT SERPL: 15 (CALC) (ref 6–22)
CALCIUM SERPL-MCNC: 10.1 MG/DL (ref 8.6–10.4)
CALCIUM SERPL-MCNC: 10.1 MG/DL (ref 8.6–10.4)
CHLORIDE SERPL-SCNC: 105 MMOL/L (ref 98–110)
CHOLEST SERPL-MCNC: 162 MG/DL
CHOLEST/HDLC SERPL: 1.8 (CALC)
CO2 SERPL-SCNC: 24 MMOL/L (ref 20–32)
CREAT SERPL-MCNC: 1.1 MG/DL (ref 0.6–0.95)
EOSINOPHIL # BLD AUTO: 148 CELLS/UL (ref 15–500)
EOSINOPHIL NFR BLD AUTO: 2.5 %
ERYTHROCYTE [DISTWIDTH] IN BLOOD BY AUTOMATED COUNT: 13.2 % (ref 11–15)
GAMMA GLOB SERPL ELPH-MCNC: 1 G/DL (ref 0.8–1.7)
GFR/BSA.PRED SERPLBLD CYS-BASED-ARV: 46 ML/MIN/1.73M2
GLOBULIN SER CALC-MCNC: 3.1 G/DL (CALC) (ref 1.9–3.7)
GLUCOSE SERPL-MCNC: 108 MG/DL (ref 65–99)
HCT VFR BLD AUTO: 40.2 % (ref 35–45)
HDLC SERPL-MCNC: 91 MG/DL
HGB BLD-MCNC: 12.7 G/DL (ref 11.7–15.5)
LDLC SERPL CALC-MCNC: 55 MG/DL (CALC)
LYMPHOCYTES # BLD AUTO: 2136 CELLS/UL (ref 850–3900)
LYMPHOCYTES NFR BLD AUTO: 36.2 %
MCH RBC QN AUTO: 28.3 PG (ref 27–33)
MCHC RBC AUTO-ENTMCNC: 31.6 G/DL (ref 32–36)
MCV RBC AUTO: 89.5 FL (ref 80–100)
MONOCYTES # BLD AUTO: 519 CELLS/UL (ref 200–950)
MONOCYTES NFR BLD AUTO: 8.8 %
NEUTROPHILS # BLD AUTO: 3027 CELLS/UL (ref 1500–7800)
NEUTROPHILS NFR BLD AUTO: 51.3 %
NONHDLC SERPL-MCNC: 71 MG/DL (CALC)
PLATELET # BLD AUTO: 294 THOUSAND/UL (ref 140–400)
PMV BLD REES-ECKER: 11.4 FL (ref 7.5–12.5)
POTASSIUM SERPL-SCNC: 4.8 MMOL/L (ref 3.5–5.3)
PROT SERPL-MCNC: 7.3 G/DL (ref 6.1–8.1)
PROT SERPL-MCNC: 7.3 G/DL (ref 6.1–8.1)
PTH-INTACT SERPL-MCNC: 61 PG/ML (ref 16–77)
RBC # BLD AUTO: 4.49 MILLION/UL (ref 3.8–5.1)
SODIUM SERPL-SCNC: 138 MMOL/L (ref 135–146)
T4 FREE SERPL-MCNC: 1.5 NG/DL (ref 0.8–1.8)
TRIGL SERPL-MCNC: 80 MG/DL
TSH SERPL-ACNC: 0.1 MIU/L (ref 0.4–4.5)
WBC # BLD AUTO: 5.9 THOUSAND/UL (ref 3.8–10.8)

## 2024-11-11 DIAGNOSIS — E03.9 ACQUIRED HYPOTHYROIDISM: Primary | ICD-10-CM

## 2024-11-11 RX ORDER — LEVOTHYROXINE SODIUM 50 UG/1
TABLET ORAL
Status: SHIPPED
Start: 2024-11-11

## 2024-11-12 ENCOUNTER — TELEPHONE (OUTPATIENT)
Dept: INTERNAL MEDICINE CLINIC | Facility: CLINIC | Age: 89
End: 2024-11-12

## 2024-11-12 NOTE — TELEPHONE ENCOUNTER
----- Message from Emigdio Farley DO sent at 11/11/2024  5:24 PM EST -----  Labs stable.  No significant concerns from my standpoint.  Thyroid numbers are slightly out of range.  Would recommend reducing levothyroxine dose.  Can accomplish this by skipping 1 dose a week.  So, for example, take levothyroxine 1 tablet by mouth Monday through Saturday, skip dose on Sundays    Can recheck thyroid tests in 6-8 weeks

## 2025-01-15 DIAGNOSIS — K21.9 GASTROESOPHAGEAL REFLUX DISEASE WITHOUT ESOPHAGITIS: ICD-10-CM

## 2025-01-29 DIAGNOSIS — E78.5 HYPERLIPIDEMIA, UNSPECIFIED HYPERLIPIDEMIA TYPE: ICD-10-CM

## 2025-01-29 DIAGNOSIS — I10 HYPERTENSION, UNSPECIFIED TYPE: ICD-10-CM

## 2025-01-29 DIAGNOSIS — E55.9 VITAMIN D DEFICIENCY: ICD-10-CM

## 2025-02-13 ENCOUNTER — RA CDI HCC (OUTPATIENT)
Dept: OTHER | Facility: HOSPITAL | Age: OVER 89
End: 2025-02-13

## 2025-02-19 ENCOUNTER — OFFICE VISIT (OUTPATIENT)
Dept: INTERNAL MEDICINE CLINIC | Facility: CLINIC | Age: OVER 89
End: 2025-02-19
Payer: MEDICARE

## 2025-02-19 VITALS
DIASTOLIC BLOOD PRESSURE: 80 MMHG | SYSTOLIC BLOOD PRESSURE: 124 MMHG | BODY MASS INDEX: 18.77 KG/M2 | WEIGHT: 95.6 LBS | TEMPERATURE: 95.9 F | OXYGEN SATURATION: 97 % | HEART RATE: 57 BPM | HEIGHT: 60 IN

## 2025-02-19 DIAGNOSIS — N18.32 STAGE 3B CHRONIC KIDNEY DISEASE (HCC): ICD-10-CM

## 2025-02-19 DIAGNOSIS — E03.9 ACQUIRED HYPOTHYROIDISM: Primary | ICD-10-CM

## 2025-02-19 DIAGNOSIS — I10 PRIMARY HYPERTENSION: ICD-10-CM

## 2025-02-19 DIAGNOSIS — E78.5 HYPERLIPIDEMIA, UNSPECIFIED HYPERLIPIDEMIA TYPE: ICD-10-CM

## 2025-02-19 DIAGNOSIS — K21.9 GASTROESOPHAGEAL REFLUX DISEASE WITHOUT ESOPHAGITIS: ICD-10-CM

## 2025-02-19 PROCEDURE — 99214 OFFICE O/P EST MOD 30 MIN: CPT | Performed by: INTERNAL MEDICINE

## 2025-02-19 PROCEDURE — G2211 COMPLEX E/M VISIT ADD ON: HCPCS | Performed by: INTERNAL MEDICINE

## 2025-02-19 NOTE — ASSESSMENT & PLAN NOTE
Lab Results   Component Value Date    EGFR 46 (L) 11/04/2024    EGFR 45 (L) 03/28/2024    EGFR 45 (L) 03/28/2024    CREATININE 1.10 (H) 11/04/2024    CREATININE 1.12 (H) 03/28/2024    CREATININE 1.12 (H) 03/28/2024   Stable. Continue to monitor

## 2025-02-19 NOTE — ASSESSMENT & PLAN NOTE
Well-controlled  Continue amlodipine 5 mg in the morning, 10 mg in the evening  Spironolactone 37.5 mg daily  Toprol- mg daily    Heart rate in the high 50s today.  She is asymptomatic.  Does not report any recent lightheadedness, orthostatic symptoms, fatigue.  Will continue with current dose of Toprol-XL for now.  Can consider dose reduction in the future if she develops any of the above symptoms or has worsening bradycardia or low BP

## 2025-02-19 NOTE — ASSESSMENT & PLAN NOTE
TSH suppressed on most recent set of labs.  Levothyroxine dose reduced to 50 mcg Monday through Saturday, skipping dose on Sundays    Recheck thyroid studies at this time and will further adjust dose if needed  Orders:  •  TSH, 3rd generation; Future  •  T4, free; Future

## 2025-02-19 NOTE — PROGRESS NOTES
Name: Sabine Amador      : 1927      MRN: [id number]  Encounter Provider: Emigdio Falrey DO  Encounter Date: 2025   Encounter [facility]  :  Medical history of hypertension, hypothyroidism, GERD, hyperlipidemia, MGUS      Here today for follow up  Assessment & Plan  Acquired hypothyroidism  TSH suppressed on most recent set of labs.  Levothyroxine dose reduced to 50 mcg Monday through Saturday, skipping dose on Sundays    Recheck thyroid studies at this time and will further adjust dose if needed  Orders:  •  TSH, 3rd generation; Future  •  T4, free; Future    Stage 3b chronic kidney disease (HCC)  Lab Results   Component Value Date    EGFR 46 (L) 2024    EGFR 45 (L) 2024    EGFR 45 (L) 2024    CREATININE 1.10 (H) 2024    CREATININE 1.12 (H) 2024    CREATININE 1.12 (H) 2024   Stable. Continue to monitor        Primary hypertension  Well-controlled  Continue amlodipine 5 mg in the morning, 10 mg in the evening  Spironolactone 37.5 mg daily  Toprol- mg daily    Heart rate in the high 50s today.  She is asymptomatic.  Does not report any recent lightheadedness, orthostatic symptoms, fatigue.  Will continue with current dose of Toprol-XL for now.  Can consider dose reduction in the future if she develops any of the above symptoms or has worsening bradycardia or low BP          Gastroesophageal reflux disease without esophagitis  Stable.  Continue Prilosec as needed         Hyperlipidemia, unspecified hyperlipidemia type  Well-controlled  Continue simvastatin              History of Present Illness   HPI  Review of Systems    Objective   /80 (BP Location: Left arm, Patient Position: Sitting, Cuff Size: Standard)   Pulse 57   Temp (!) 95.9 °F (35.5 °C) (Tympanic)   Ht 5' (1.524 m)   Wt 43.4 kg (95 lb 9.6 oz)   SpO2 97%   BMI 18.67 kg/m²      Physical Exam  Cardiovascular:      Rate and Rhythm: Normal rate and  regular rhythm.      Heart sounds: Normal heart sounds. No murmur heard.  Pulmonary:      Effort: Pulmonary effort is normal.      Breath sounds: Normal breath sounds. No wheezing, rhonchi or rales.

## 2025-02-24 DIAGNOSIS — E55.9 VITAMIN D DEFICIENCY: ICD-10-CM

## 2025-02-24 DIAGNOSIS — I10 HYPERTENSION, UNSPECIFIED TYPE: ICD-10-CM

## 2025-02-24 DIAGNOSIS — E78.5 HYPERLIPIDEMIA, UNSPECIFIED HYPERLIPIDEMIA TYPE: ICD-10-CM

## 2025-02-24 RX ORDER — SPIRONOLACTONE 25 MG/1
25 TABLET ORAL DAILY
Qty: 135 TABLET | Refills: 1 | Status: SHIPPED | OUTPATIENT
Start: 2025-02-24

## 2025-02-24 NOTE — TELEPHONE ENCOUNTER
Reason for call:   [x] Refill   [] Prior Auth  [] Other:     Office:   [x] PCP/Provider - [facility]  Authorized By: Emigdio Farley DO  [] Specialty/Provider -     Medication:     Cholecalciferol (D3-1000) 1,000 units tablet     spironolactone (ALDACTONE) 25 mg tablet       Pharmacy: [facility]/pharmacy #[id number]  [place] [address]-     Does the patient have enough for 3 days?   [] Yes   [x] No - Send as HP to POD

## 2025-03-03 DIAGNOSIS — E78.5 HYPERLIPIDEMIA, UNSPECIFIED HYPERLIPIDEMIA TYPE: ICD-10-CM

## 2025-03-03 DIAGNOSIS — I10 HYPERTENSION, UNSPECIFIED TYPE: ICD-10-CM

## 2025-03-03 DIAGNOSIS — E55.9 VITAMIN D DEFICIENCY: ICD-10-CM

## 2025-03-03 NOTE — TELEPHONE ENCOUNTER
Medication: Cholecalciferol (D3-1000) 1,000 units tablet     Dose/Frequency: TAKE 2 TABLETS (2,000 UNITS TOTAL) BY MOUTH DAILY     Quantity: Dispense: 200 tablet  Refills: 1 ordered    Pharmacy: [facility]/pharmacy #[id number] - [place] - [address] [place] [address] [phone]    Office:   [x] PCP/Provider - Emigdio Farley, DO   [] Speciality/Provider -     Does the patient have enough for 3 days?   [] Yes   [x] No - Send as HP to POD    Patient reports last fill was in October. She is currently out of medication. Refill request on 2/24/25 was denied. Please advise patient and call.

## 2025-03-03 NOTE — TELEPHONE ENCOUNTER
Patient calling to ask about medication refill request. States has been denied and patient is not sure why. She is out of this medication. Please reach out to patient to discuss reason for medication denial. Message states refill not appropriate, but no other details available.

## 2025-03-26 DIAGNOSIS — I10 HYPERTENSION, UNSPECIFIED TYPE: ICD-10-CM

## 2025-03-27 RX ORDER — METOPROLOL SUCCINATE 200 MG/1
200 TABLET, EXTENDED RELEASE ORAL DAILY
Qty: 90 TABLET | Refills: 1 | Status: SHIPPED | OUTPATIENT
Start: 2025-03-27

## 2025-03-29 DIAGNOSIS — E78.5 HYPERLIPIDEMIA, UNSPECIFIED HYPERLIPIDEMIA TYPE: ICD-10-CM

## 2025-03-29 RX ORDER — SIMVASTATIN 10 MG
10 TABLET ORAL DAILY
Qty: 90 TABLET | Refills: 1 | Status: SHIPPED | OUTPATIENT
Start: 2025-03-29

## 2025-05-01 ENCOUNTER — TELEPHONE (OUTPATIENT)
Dept: INTERNAL MEDICINE CLINIC | Facility: CLINIC | Age: OVER 89
End: 2025-05-01

## 2025-05-05 DIAGNOSIS — I10 HYPERTENSION, UNSPECIFIED TYPE: ICD-10-CM

## 2025-05-06 RX ORDER — AMLODIPINE BESYLATE 5 MG/1
TABLET ORAL
Qty: 270 TABLET | Refills: 1 | Status: SHIPPED | OUTPATIENT
Start: 2025-05-06

## 2025-05-27 ENCOUNTER — TELEPHONE (OUTPATIENT)
Dept: INTERNAL MEDICINE CLINIC | Facility: CLINIC | Age: OVER 89
End: 2025-05-27

## 2025-05-27 NOTE — TELEPHONE ENCOUNTER
left message to call office to reschedule appt that is currently for 6/25/2025 due to Dr. Farley's hours changing

## 2025-05-27 NOTE — TELEPHONE ENCOUNTER
Patient called to schedule appointment with Dr Farley, scheduled for 06/06 at 930am but patient is looking for an appointment around 8am if possible . Patient is unable to come in in Tuesday or Thursdays . Please return patient call to see if able to schedule elsewhere at time patient is requesting .

## 2025-05-27 NOTE — TELEPHONE ENCOUNTER
Called patient left message advising there are no available appts at 8am on 6/6/25. I advised if she is looking for an appt for 8am she would have to call back and change the date of appt

## 2025-06-02 ENCOUNTER — RA CDI HCC (OUTPATIENT)
Dept: OTHER | Facility: HOSPITAL | Age: OVER 89
End: 2025-06-02

## 2025-06-06 ENCOUNTER — OFFICE VISIT (OUTPATIENT)
Dept: INTERNAL MEDICINE CLINIC | Facility: CLINIC | Age: OVER 89
End: 2025-06-06
Payer: MEDICARE

## 2025-06-06 VITALS
RESPIRATION RATE: 18 BRPM | BODY MASS INDEX: 18.14 KG/M2 | OXYGEN SATURATION: 94 % | HEIGHT: 60 IN | HEART RATE: 62 BPM | WEIGHT: 92.4 LBS | SYSTOLIC BLOOD PRESSURE: 120 MMHG | TEMPERATURE: 97.4 F | DIASTOLIC BLOOD PRESSURE: 70 MMHG

## 2025-06-06 DIAGNOSIS — E03.9 ACQUIRED HYPOTHYROIDISM: ICD-10-CM

## 2025-06-06 DIAGNOSIS — N18.32 STAGE 3B CHRONIC KIDNEY DISEASE (HCC): ICD-10-CM

## 2025-06-06 DIAGNOSIS — F17.210 CIGARETTE SMOKER: ICD-10-CM

## 2025-06-06 DIAGNOSIS — I10 PRIMARY HYPERTENSION: Primary | ICD-10-CM

## 2025-06-06 DIAGNOSIS — K21.9 GASTROESOPHAGEAL REFLUX DISEASE WITHOUT ESOPHAGITIS: ICD-10-CM

## 2025-06-06 DIAGNOSIS — E78.5 HYPERLIPIDEMIA, UNSPECIFIED HYPERLIPIDEMIA TYPE: ICD-10-CM

## 2025-06-06 PROCEDURE — 99214 OFFICE O/P EST MOD 30 MIN: CPT | Performed by: INTERNAL MEDICINE

## 2025-06-06 PROCEDURE — G2211 COMPLEX E/M VISIT ADD ON: HCPCS | Performed by: INTERNAL MEDICINE

## 2025-06-06 NOTE — PROGRESS NOTES
Name: Sabine Amador      : 1927      MRN: [id number]  Encounter Provider: Emigdio Farley DO  Encounter Date: 2025   Encounter [facility]  :  Medical history of hypertension, hypothyroidism, GERD, hyperlipidemia, MGUS    Assessment & Plan  Primary hypertension  Well-controlled  Continue amlodipine 5 mg in the morning, 10 mg in the evening  Spironolactone 25 mg daily  Toprol- mg daily            Acquired hypothyroidism  TSH suppressed on most recent set of labs.  Levothyroxine dose reduced to 50 mcg Monday through Saturday, skipping dose on Sundays    Has not yet gone for repeat thyroid studies as previously requested.  Encouraged her to complete thyroid studies after today's visit at her earliest convenience        Gastroesophageal reflux disease without esophagitis  Stable.  Continue Prilosec as needed            Stage 3b chronic kidney disease (HCC)  Lab Results   Component Value Date    EGFR 46 (L) 2024    EGFR 45 (L) 2024    EGFR 45 (L) 2024    CREATININE 1.10 (H) 2024    CREATININE 1.12 (H) 2024    CREATININE 1.12 (H) 2024   Stable. Continue to monitor           Cigarette smoker  Continues to smoke approximately 5 cigarettes a day          Hyperlipidemia, unspecified hyperlipidemia type  Well-controlled  Continue simvastatin                 History of Present Illness   HPI  Review of Systems    Objective   /70 (BP Location: Left arm, Patient Position: Sitting, Cuff Size: Standard)   Pulse 62   Temp (!) 97.4 °F (36.3 °C) (Temporal)   Resp 18   Ht 5' (1.524 m)   Wt 41.9 kg (92 lb 6.4 oz)   SpO2 94%   BMI 18.05 kg/m²      Physical Exam    Cardiovascular:      Rate and Rhythm: Normal rate and regular rhythm.      Heart sounds: Normal heart sounds. No murmur heard.  Pulmonary:      Effort: Pulmonary effort is normal.      Breath sounds: Normal breath sounds. No wheezing, rhonchi or rales.

## 2025-06-06 NOTE — ASSESSMENT & PLAN NOTE
TSH suppressed on most recent set of labs.  Levothyroxine dose reduced to 50 mcg Monday through Saturday, skipping dose on Sundays    Has not yet gone for repeat thyroid studies as previously requested.  Encouraged her to complete thyroid studies after today's visit at her earliest convenience

## 2025-06-12 LAB
T4 FREE SERPL-MCNC: 0.7 NG/DL (ref 0.8–1.8)
TSH SERPL-ACNC: 92.15 MIU/L (ref 0.4–4.5)

## 2025-06-17 ENCOUNTER — RESULTS FOLLOW-UP (OUTPATIENT)
Dept: INTERNAL MEDICINE CLINIC | Facility: CLINIC | Age: OVER 89
End: 2025-06-17

## 2025-06-17 DIAGNOSIS — E03.9 ACQUIRED HYPOTHYROIDISM: ICD-10-CM

## 2025-06-17 RX ORDER — LEVOTHYROXINE SODIUM 50 UG/1
50 TABLET ORAL DAILY
Start: 2025-06-17 | End: 2025-06-18 | Stop reason: SDUPTHER

## 2025-06-17 NOTE — TELEPHONE ENCOUNTER
----- Message from Emigdio Farley DO sent at 6/17/2025  8:53 AM EDT -----  Reviewed labs.  Thyroid numbers are out of range.  Recommend increasing levothyroxine to 50 mcg daily (will no longer skip dose on Sundays).    Recheck thyroid studies in approximately 6 weeks  ----- Message -----  From: [employer] Tip or Skip Lab Results In  Sent: 6/12/2025   2:00 AM EDT  To: Emigdio Farley DO

## 2025-06-18 DIAGNOSIS — E03.9 ACQUIRED HYPOTHYROIDISM: ICD-10-CM

## 2025-06-18 RX ORDER — LEVOTHYROXINE SODIUM 50 UG/1
50 TABLET ORAL DAILY
Qty: 90 TABLET | Refills: 1 | Status: SHIPPED | OUTPATIENT
Start: 2025-06-18

## 2025-06-18 NOTE — TELEPHONE ENCOUNTER
Called pt detailed message given about the levothyroxine 50 mcg can you please sent it to the pharmacy thank you

## 2025-07-09 DIAGNOSIS — K21.9 GASTROESOPHAGEAL REFLUX DISEASE WITHOUT ESOPHAGITIS: ICD-10-CM

## 2025-07-10 RX ORDER — OMEPRAZOLE 20 MG/1
20 CAPSULE, DELAYED RELEASE ORAL DAILY PRN
Qty: 90 CAPSULE | Refills: 1 | Status: SHIPPED | OUTPATIENT
Start: 2025-07-10

## 2025-08-17 DIAGNOSIS — E78.5 HYPERLIPIDEMIA, UNSPECIFIED HYPERLIPIDEMIA TYPE: ICD-10-CM

## 2025-08-17 DIAGNOSIS — I10 HYPERTENSION, UNSPECIFIED TYPE: ICD-10-CM

## 2025-08-17 DIAGNOSIS — E55.9 VITAMIN D DEFICIENCY: ICD-10-CM

## 2025-08-18 RX ORDER — MULTIVIT-MIN/IRON/FOLIC ACID/K 18-600-40
2000 CAPSULE ORAL DAILY
Qty: 180 TABLET | Refills: 2 | Status: SHIPPED | OUTPATIENT
Start: 2025-08-18